# Patient Record
Sex: MALE | Race: BLACK OR AFRICAN AMERICAN | NOT HISPANIC OR LATINO | Employment: FULL TIME | URBAN - METROPOLITAN AREA
[De-identification: names, ages, dates, MRNs, and addresses within clinical notes are randomized per-mention and may not be internally consistent; named-entity substitution may affect disease eponyms.]

---

## 2021-09-05 ENCOUNTER — HOSPITAL ENCOUNTER (INPATIENT)
Facility: HOSPITAL | Age: 26
LOS: 4 days | Discharge: HOME | DRG: 177 | End: 2021-09-10
Attending: EMERGENCY MEDICINE | Admitting: INTERNAL MEDICINE
Payer: COMMERCIAL

## 2021-09-05 ENCOUNTER — APPOINTMENT (EMERGENCY)
Dept: CT IMAGING | Facility: HOSPITAL | Age: 26
DRG: 177 | End: 2021-09-05
Payer: COMMERCIAL

## 2021-09-05 ENCOUNTER — APPOINTMENT (EMERGENCY)
Dept: RADIOLOGY | Facility: HOSPITAL | Age: 26
DRG: 177 | End: 2021-09-05
Payer: COMMERCIAL

## 2021-09-05 DIAGNOSIS — U07.1 PNEUMONIA DUE TO COVID-19 VIRUS: Primary | ICD-10-CM

## 2021-09-05 DIAGNOSIS — I26.99 PULMONARY EMBOLI (HCC): ICD-10-CM

## 2021-09-05 DIAGNOSIS — G47.34 NOCTURNAL HYPOXIA: ICD-10-CM

## 2021-09-05 DIAGNOSIS — I26.99 ACUTE PULMONARY EMBOLISM, UNSPECIFIED PULMONARY EMBOLISM TYPE, UNSPECIFIED WHETHER ACUTE COR PULMONALE PRESENT (HCC): ICD-10-CM

## 2021-09-05 DIAGNOSIS — J12.82 PNEUMONIA DUE TO COVID-19 VIRUS: Primary | ICD-10-CM

## 2021-09-05 DIAGNOSIS — U07.1 COVID-19: ICD-10-CM

## 2021-09-05 PROBLEM — R74.8 ELEVATED LIVER ENZYMES: Status: ACTIVE | Noted: 2021-09-05

## 2021-09-05 PROBLEM — E87.1 HYPONATREMIA: Status: ACTIVE | Noted: 2021-09-05

## 2021-09-05 LAB
ALBUMIN SERPL BCP-MCNC: 3.3 G/DL (ref 3.5–5)
ALP SERPL-CCNC: 68 U/L (ref 46–116)
ALT SERPL W P-5'-P-CCNC: 185 U/L (ref 12–78)
ANION GAP SERPL CALCULATED.3IONS-SCNC: 11 MMOL/L (ref 4–13)
APTT PPP: 29 SECONDS (ref 23–37)
AST SERPL W P-5'-P-CCNC: 69 U/L (ref 5–45)
BASOPHILS # BLD AUTO: 0.03 THOUSANDS/ΜL (ref 0–0.1)
BASOPHILS NFR BLD AUTO: 0 % (ref 0–1)
BILIRUB SERPL-MCNC: 1.65 MG/DL (ref 0.2–1)
BUN SERPL-MCNC: 10 MG/DL (ref 5–25)
CALCIUM ALBUM COR SERPL-MCNC: 9.5 MG/DL (ref 8.3–10.1)
CALCIUM SERPL-MCNC: 8.9 MG/DL (ref 8.3–10.1)
CHLORIDE SERPL-SCNC: 95 MMOL/L (ref 100–108)
CO2 SERPL-SCNC: 26 MMOL/L (ref 21–32)
CREAT SERPL-MCNC: 0.81 MG/DL (ref 0.6–1.3)
D DIMER PPP FEU-MCNC: 3.81 UG/ML FEU
EOSINOPHIL # BLD AUTO: 0.03 THOUSAND/ΜL (ref 0–0.61)
EOSINOPHIL NFR BLD AUTO: 0 % (ref 0–6)
ERYTHROCYTE [DISTWIDTH] IN BLOOD BY AUTOMATED COUNT: 12.4 % (ref 11.6–15.1)
GFR SERPL CREATININE-BSD FRML MDRD: 144 ML/MIN/1.73SQ M
GLUCOSE SERPL-MCNC: 116 MG/DL (ref 65–140)
HCT VFR BLD AUTO: 45.9 % (ref 36.5–49.3)
HGB BLD-MCNC: 16.6 G/DL (ref 12–17)
IMM GRANULOCYTES # BLD AUTO: 0.1 THOUSAND/UL (ref 0–0.2)
IMM GRANULOCYTES NFR BLD AUTO: 1 % (ref 0–2)
INR PPP: 1.12 (ref 0.84–1.19)
LYMPHOCYTES # BLD AUTO: 1.17 THOUSANDS/ΜL (ref 0.6–4.47)
LYMPHOCYTES NFR BLD AUTO: 11 % (ref 14–44)
MCH RBC QN AUTO: 29.6 PG (ref 26.8–34.3)
MCHC RBC AUTO-ENTMCNC: 36.2 G/DL (ref 31.4–37.4)
MCV RBC AUTO: 82 FL (ref 82–98)
MONOCYTES # BLD AUTO: 1.32 THOUSAND/ΜL (ref 0.17–1.22)
MONOCYTES NFR BLD AUTO: 13 % (ref 4–12)
NEUTROPHILS # BLD AUTO: 7.82 THOUSANDS/ΜL (ref 1.85–7.62)
NEUTS SEG NFR BLD AUTO: 75 % (ref 43–75)
NRBC BLD AUTO-RTO: 0 /100 WBCS
PLATELET # BLD AUTO: 363 THOUSANDS/UL (ref 149–390)
PMV BLD AUTO: 9.2 FL (ref 8.9–12.7)
POTASSIUM SERPL-SCNC: 3.6 MMOL/L (ref 3.5–5.3)
PROT SERPL-MCNC: 8.2 G/DL (ref 6.4–8.2)
PROTHROMBIN TIME: 13.9 SECONDS (ref 11.6–14.5)
RBC # BLD AUTO: 5.61 MILLION/UL (ref 3.88–5.62)
SARS-COV-2 RNA RESP QL NAA+PROBE: POSITIVE
SODIUM SERPL-SCNC: 132 MMOL/L (ref 136–145)
TROPONIN I SERPL-MCNC: <0.02 NG/ML
TSH SERPL DL<=0.05 MIU/L-ACNC: 0.26 UIU/ML (ref 0.36–3.74)
WBC # BLD AUTO: 10.47 THOUSAND/UL (ref 4.31–10.16)

## 2021-09-05 PROCEDURE — 85025 COMPLETE CBC W/AUTO DIFF WBC: CPT | Performed by: EMERGENCY MEDICINE

## 2021-09-05 PROCEDURE — 84484 ASSAY OF TROPONIN QUANT: CPT | Performed by: EMERGENCY MEDICINE

## 2021-09-05 PROCEDURE — U0005 INFEC AGEN DETEC AMPLI PROBE: HCPCS | Performed by: EMERGENCY MEDICINE

## 2021-09-05 PROCEDURE — 84443 ASSAY THYROID STIM HORMONE: CPT | Performed by: INTERNAL MEDICINE

## 2021-09-05 PROCEDURE — 71275 CT ANGIOGRAPHY CHEST: CPT

## 2021-09-05 PROCEDURE — 99285 EMERGENCY DEPT VISIT HI MDM: CPT

## 2021-09-05 PROCEDURE — U0003 INFECTIOUS AGENT DETECTION BY NUCLEIC ACID (DNA OR RNA); SEVERE ACUTE RESPIRATORY SYNDROME CORONAVIRUS 2 (SARS-COV-2) (CORONAVIRUS DISEASE [COVID-19]), AMPLIFIED PROBE TECHNIQUE, MAKING USE OF HIGH THROUGHPUT TECHNOLOGIES AS DESCRIBED BY CMS-2020-01-R: HCPCS | Performed by: EMERGENCY MEDICINE

## 2021-09-05 PROCEDURE — 36415 COLL VENOUS BLD VENIPUNCTURE: CPT | Performed by: EMERGENCY MEDICINE

## 2021-09-05 PROCEDURE — 71045 X-RAY EXAM CHEST 1 VIEW: CPT

## 2021-09-05 PROCEDURE — 93005 ELECTROCARDIOGRAM TRACING: CPT

## 2021-09-05 PROCEDURE — 85730 THROMBOPLASTIN TIME PARTIAL: CPT | Performed by: INTERNAL MEDICINE

## 2021-09-05 PROCEDURE — 99220 PR INITIAL OBSERVATION CARE/DAY 70 MINUTES: CPT | Performed by: INTERNAL MEDICINE

## 2021-09-05 PROCEDURE — 85610 PROTHROMBIN TIME: CPT | Performed by: EMERGENCY MEDICINE

## 2021-09-05 PROCEDURE — 96374 THER/PROPH/DIAG INJ IV PUSH: CPT

## 2021-09-05 PROCEDURE — 85379 FIBRIN DEGRADATION QUANT: CPT | Performed by: EMERGENCY MEDICINE

## 2021-09-05 PROCEDURE — 85730 THROMBOPLASTIN TIME PARTIAL: CPT | Performed by: EMERGENCY MEDICINE

## 2021-09-05 PROCEDURE — 80053 COMPREHEN METABOLIC PANEL: CPT | Performed by: EMERGENCY MEDICINE

## 2021-09-05 PROCEDURE — 99291 CRITICAL CARE FIRST HOUR: CPT | Performed by: EMERGENCY MEDICINE

## 2021-09-05 RX ORDER — KETOROLAC TROMETHAMINE 30 MG/ML
30 INJECTION, SOLUTION INTRAMUSCULAR; INTRAVENOUS ONCE
Status: COMPLETED | OUTPATIENT
Start: 2021-09-05 | End: 2021-09-05

## 2021-09-05 RX ORDER — HEPARIN SODIUM 1000 [USP'U]/ML
8800 INJECTION, SOLUTION INTRAVENOUS; SUBCUTANEOUS ONCE
Status: COMPLETED | OUTPATIENT
Start: 2021-09-05 | End: 2021-09-05

## 2021-09-05 RX ORDER — KETOROLAC TROMETHAMINE 30 MG/ML
30 INJECTION, SOLUTION INTRAMUSCULAR; INTRAVENOUS EVERY 6 HOURS PRN
Status: COMPLETED | OUTPATIENT
Start: 2021-09-05 | End: 2021-09-07

## 2021-09-05 RX ORDER — IBUPROFEN 400 MG/1
400 TABLET ORAL EVERY 6 HOURS PRN
Status: DISCONTINUED | OUTPATIENT
Start: 2021-09-05 | End: 2021-09-10 | Stop reason: HOSPADM

## 2021-09-05 RX ORDER — HEPARIN SODIUM 1000 [USP'U]/ML
4400 INJECTION, SOLUTION INTRAVENOUS; SUBCUTANEOUS
Status: DISCONTINUED | OUTPATIENT
Start: 2021-09-05 | End: 2021-09-07

## 2021-09-05 RX ORDER — HEPARIN SODIUM 10000 [USP'U]/100ML
3-30 INJECTION, SOLUTION INTRAVENOUS
Status: DISCONTINUED | OUTPATIENT
Start: 2021-09-05 | End: 2021-09-07

## 2021-09-05 RX ORDER — SODIUM CHLORIDE 9 MG/ML
100 INJECTION, SOLUTION INTRAVENOUS CONTINUOUS
Status: DISPENSED | OUTPATIENT
Start: 2021-09-05 | End: 2021-09-06

## 2021-09-05 RX ORDER — ACETAMINOPHEN 325 MG/1
650 TABLET ORAL EVERY 6 HOURS PRN
Status: DISCONTINUED | OUTPATIENT
Start: 2021-09-05 | End: 2021-09-10 | Stop reason: HOSPADM

## 2021-09-05 RX ORDER — HEPARIN SODIUM 10000 [USP'U]/100ML
3-30 INJECTION, SOLUTION INTRAVENOUS
Status: DISCONTINUED | OUTPATIENT
Start: 2021-09-05 | End: 2021-09-05

## 2021-09-05 RX ORDER — HEPARIN SODIUM 1000 [USP'U]/ML
8800 INJECTION, SOLUTION INTRAVENOUS; SUBCUTANEOUS
Status: DISCONTINUED | OUTPATIENT
Start: 2021-09-05 | End: 2021-09-07

## 2021-09-05 RX ADMIN — HEPARIN SODIUM 18 UNITS/KG/HR: 10000 INJECTION, SOLUTION INTRAVENOUS at 16:56

## 2021-09-05 RX ADMIN — HEPARIN SODIUM 8800 UNITS: 1000 INJECTION INTRAVENOUS; SUBCUTANEOUS at 16:57

## 2021-09-05 RX ADMIN — KETOROLAC TROMETHAMINE 30 MG: 30 INJECTION, SOLUTION INTRAMUSCULAR; INTRAVENOUS at 21:54

## 2021-09-05 RX ADMIN — SODIUM CHLORIDE 100 ML/HR: 0.9 INJECTION, SOLUTION INTRAVENOUS at 16:58

## 2021-09-05 RX ADMIN — KETOROLAC TROMETHAMINE 30 MG: 30 INJECTION, SOLUTION INTRAMUSCULAR; INTRAVENOUS at 13:06

## 2021-09-05 RX ADMIN — IOHEXOL 85 ML: 350 INJECTION, SOLUTION INTRAVENOUS at 14:56

## 2021-09-05 NOTE — ASSESSMENT & PLAN NOTE
· Patient came in with pleuritic chest pain that started last night and did have some nausea secondary to pain  · Etiology likely secondary to COVID; no family history of blood clots  · D-dimer checked and noted to be elevated  · CTA PE study-multiple right lung segmental pulmonary emboli; no RV strain; bilateral pneumonia compatible with COVID-19 infection  · Breathing on room air and on my exam pleuritic chest pain had resolved    Plan  · Currently on heparin drip; will continue at this time, but could be switched to Lovenox tomorrow if needed  · Patient will need oral anticoagulation upon discharge  · Ibuprofen 400 mg Q 6 hour moderate pain  · Toradol 30 mg severe pain  · If patient does have pleuritic chest pain that is worsening could start Medrol Dosepak  · Will send thrombosis panel  · Patient will need follow-up with primary care provider and possibly Hematology referral on discharge

## 2021-09-05 NOTE — H&P
72 [address]  1995, 27 y o  male MRN: [id number]  Unit/Bed#: ED 24 Encounter: [id number]  Primary Care Provider: zO Gong MD   Date and time admitted to hospital: 9/5/2021 12:45 PM    * Pulmonary embolism [facility])  Assessment & Plan  · Patient came in with pleuritic chest pain that started last night and did have some nausea secondary to pain  · Etiology likely secondary to COVID; no family history of blood clots  · D-dimer checked and noted to be elevated  · CTA PE study-multiple right lung segmental pulmonary emboli; no RV strain; bilateral pneumonia compatible with COVID-19 infection  · Breathing on room air and on my exam pleuritic chest pain had resolved    Plan  · Currently on heparin drip; will continue at this time, but could be switched to Lovenox tomorrow if needed  · Patient will need oral anticoagulation upon discharge  · Ibuprofen 400 mg Q 6 hour moderate pain  · Toradol 30 mg severe pain  · If patient does have pleuritic chest pain that is worsening could start Medrol Dosepak  · Will send thrombosis panel  · Patient will need follow-up with primary care provider and possibly Hematology referral on discharge    Hyponatremia  Assessment & Plan  · Sodium 132  · Will give gentle IV hydration  · Repeat tomorrow    Elevated liver enzymes  Assessment & Plan  · No pain to palpation of right upper quadrant on exam  · AST/ALT; 69/185  · Bilirubin 1 69  · Slightly elevated; may be secondary to COVID  · Will check chronic hepatitis panel    COVID-19  Assessment & Plan  · Currently no symptoms of shortness of breath  · CT scan did show findings consistent with COVID pneumonia  · As patient is breathing on room air will hold off on steroids at this time  · Monitor O2 saturation  · Patient is anticoagulated with heparin drip currently    VTE Pharmacologic Prophylaxis: VTE Score: 4 Moderate Risk (Score 3-4) - Pharmacological DVT Prophylaxis Ordered: heparin drip    Code Status: Level 1 - Full Code   Discussion with family: Patient declined call to   Anticipated Length of Stay: Patient will be admitted on an observation basis with an anticipated length of stay of less than 2 midnights secondary to Pulmonary embolism  Total Time for Visit, including Counseling / Coordination of Care: 30 minutes Greater than 50% of this total time spent on direct patient counseling and coordination of care  Chief Complaint:  Right-sided chest pain    History of Present Illness:  Torsten Cohn is a 27 y o  male with a PMH of nothing who presents with right-sided chest pain  Patient states that his symptoms started yesterday at night which he describes as sharp pain when taking deep breath  Patient denies any associated fever, chills, nausea, vomiting, shortness of breath, lightheadedness, dizziness, or palpitations  No family history of blood clotting disorders and patient denies any medication use at this time, and tobacco abuse, alcohol use or drug use  Patient was resting comfortably on examination was breathing on room air throughout exam without any oxygen desaturation noted  Patient did not receive COVID vaccine    Review of Systems:  Review of Systems   Constitutional: Negative for chills, fatigue, fever and unexpected weight change  HENT: Negative for congestion, ear pain, sore throat and tinnitus  Eyes: Negative for visual disturbance  Respiratory: Negative for cough, chest tightness, shortness of breath and wheezing  Cardiovascular: Negative for chest pain, palpitations and leg swelling  Gastrointestinal: Negative for abdominal pain, constipation, diarrhea, nausea and vomiting  Genitourinary: Negative for dysuria and frequency  Skin: Negative for rash  Neurological: Negative for dizziness, weakness, light-headedness, numbness and headaches  All other systems reviewed and are negative        Past Medical and Surgical History:   History reviewed  No pertinent past medical history  History reviewed  No pertinent surgical history  Meds/Allergies:  Prior to Admission medications    Not on File     I have reviewed home medications with patient personally  Allergies: No Known Allergies    Social History:  Marital Status: Single   Patient Pre-hospital Living Situation: Home  Patient Pre-hospital Level of Mobility: walks  Substance Use History:   Social History     Substance and Sexual Activity   Alcohol Use Not Currently     Social History     Tobacco Use   Smoking Status Never Smoker   Smokeless Tobacco Never Used     Social History     Substance and Sexual Activity   Drug Use Not Currently       Family History:  History reviewed  No pertinent family history  Physical Exam:     Vitals:   Blood Pressure: 125/70 (09/05/21 1530)  Pulse: 85 (09/05/21 1530)  Respirations: (!) 24 (09/05/21 1530)  Height: 6' 1" (185 4 cm) (09/05/21 1237)  Weight - Scale: 104 kg (229 lb 4 5 oz) (09/05/21 1602)  SpO2: 94 % (09/05/21 1530)    Physical Exam  Vitals and nursing note reviewed  Constitutional:       General: He is not in acute distress  Appearance: He is well-developed  Comments: WESLEY PEACOCKT:      Head: Normocephalic and atraumatic  Eyes:      General: No scleral icterus  Conjunctiva/sclera: Conjunctivae normal    Cardiovascular:      Rate and Rhythm: Normal rate and regular rhythm  Heart sounds: Normal heart sounds  No murmur heard  No friction rub  No gallop  Pulmonary:      Effort: Pulmonary effort is normal  No respiratory distress  Breath sounds: Normal breath sounds  No wheezing or rales  Abdominal:      General: Bowel sounds are normal  There is no distension  Palpations: Abdomen is soft  Tenderness: There is no abdominal tenderness  Musculoskeletal:         General: Normal range of motion  Skin:     General: Skin is warm  Findings: No rash     Neurological:      Mental Status: He is alert and oriented to person, place, and time  Additional Data:     Lab Results:  Results from last 7 days   Lab Units 09/05/21  1310   WBC Thousand/uL 10 47*   HEMOGLOBIN g/dL 16 6   HEMATOCRIT % 45 9   PLATELETS Thousands/uL 363   NEUTROS PCT % 75   LYMPHS PCT % 11*   MONOS PCT % 13*   EOS PCT % 0     Results from last 7 days   Lab Units 09/05/21  1310   SODIUM mmol/L 132*   POTASSIUM mmol/L 3 6   CHLORIDE mmol/L 95*   CO2 mmol/L 26   BUN mg/dL 10   CREATININE mg/dL 0 81   ANION GAP mmol/L 11   CALCIUM mg/dL 8 9   ALBUMIN g/dL 3 3*   TOTAL BILIRUBIN mg/dL 1 65*   ALK PHOS U/L 68   ALT U/L 185*   AST U/L 69*   GLUCOSE RANDOM mg/dL 116     Results from last 7 days   Lab Units 09/05/21  1310   INR  1 12                   Imaging: Reviewed radiology reports from this admission including: chest CT scan  CTA ED chest PE Study   Final Result by Ralph Avendano MD (09/05 1530)      Multiple right lung segmental pulmonary emboli  Measured RV/LV ratio is within normal limits at less than 0 9  Extensive bilateral pneumonia compatible with confirmed COVID 19 infection  I personally discussed this study with Gil Abraham on 9/5/2021 at 3:30 PM                Workstation performed: [id number]         XR chest 1 view portable   Final Result by Ralph Avendano MD (09/05 1522)      Bilateral pneumonia compatible with confirmed COVID 19 infection  Workstation performed: [id number]             EKG and Other Studies Reviewed on Admission:   · EKG: NSR  HR 89     ** Please Note: This note has been constructed using a voice recognition system   **

## 2021-09-05 NOTE — ASSESSMENT & PLAN NOTE
· Present on admission; likely in setting of covid-19 infection  · No pain to palpation of right upper quadrant on exam  · AST/ALT; 69/185; Bilirubin 1 69 on admission  · Chronic hepatitis panel ordered; not yet collected  · Trending down today

## 2021-09-05 NOTE — ASSESSMENT & PLAN NOTE
· Patient came in with pleuritic chest pain that started last night with nausea, secondary to pain  · Etiology likely secondary to COVID; no family history of blood clots  · D-dimer checked and noted to be elevated  · CTA PE study-multiple right lung segmental pulmonary emboli; no RV strain; bilateral pneumonia compatible with COVID-19 infection  · Breathing on room air, with oxygen saturation levels of 93-97%; still with some pleuritic chest pain, although improved  · Currently on heparin drip; will continue at this time, but could be switched to Lovenox tomorrow if needed  · Patient will need oral anticoagulation upon discharge, will [facility]  · Ibuprofen 400 mg Q 6 hour moderate pain  · Toradol 30 mg severe pain  · If patient does have pleuritic chest pain that is worsening could start Medrol Dosepak  · Thrombosis panel pending    · Patient will need follow-up with primary care provider and possibly Hematology referral on discharge

## 2021-09-05 NOTE — ASSESSMENT & PLAN NOTE
· Currently no symptoms of shortness of breath  · CT scan did show findings consistent with COVID pneumonia  · As patient is breathing on room air will hold off on steroids at this time  · Monitor O2 saturation  · Patient is anticoagulated with heparin drip currently

## 2021-09-05 NOTE — ED PROVIDER NOTES
History  Chief Complaint   Patient presents with    Abdominal Pain     pt reports RUQ abdominal pain x2 days     27year old male patient presents emergency department for evaluation of right-sided chest pain  The pain is in the costophrenic angle and only improves when he is sitting forward  The patient sat was 93% when I walked into the room and seems to be because the patient's splinting, not taking deep breaths, and a great deal of discomfort  He denies my history, meds, allergies, tobacco abuse, or any other associated symptoms  He denies family history of early coronary artery disease, he denies family history of any blood clotting disorder  Patient is currently sitting at the edge of the bed, speaking in full sentences with pausing to breathe  He states that he is doing this because of pain  Bedside ultrasound lung slide the gallbladder is grossly normal in its appearance, as is kidney  Differential diagnosis for this patient includes was not limited to pulmonary embolism, acute coronary syndrome, pneumothorax      History provided by:  Patient   used: No    Abdominal Pain  Pain location:  Generalized  Pain quality: aching, heavy and sharp    Pain radiates to:  Does not radiate  Pain severity:  Mild  Onset quality:  Gradual  Timing:  Constant  Progression:  Worsening  Chronicity:  New  Context: not awakening from sleep, not diet changes and not recent sexual activity    Relieved by:  Nothing  Worsened by:  Nothing  Ineffective treatments:  None tried  Associated symptoms: no cough, no fever, no hematochezia and no sore throat        None       History reviewed  No pertinent past medical history  History reviewed  No pertinent surgical history  History reviewed  No pertinent family history  I have reviewed and agree with the history as documented      E-Cigarette/Vaping    E-Cigarette Use Never User      E-Cigarette/Vaping Substances     Social History     Tobacco Use    Smoking status: Never Smoker    Smokeless tobacco: Never Used   Vaping Use    Vaping Use: Never used   Substance Use Topics    Alcohol use: Not Currently    Drug use: Not Currently       Review of Systems   Constitutional: Negative for fever  HENT: Negative for sore throat  Respiratory: Negative for cough  Gastrointestinal: Positive for abdominal pain  Negative for hematochezia  All other systems reviewed and are negative  Physical Exam  Physical Exam  Vitals and nursing note reviewed  Constitutional:       General: He is not in acute distress  Appearance: He is well-developed  He is not diaphoretic  HENT:      Head: Normocephalic and atraumatic  Right Ear: External ear normal       Left Ear: External ear normal    Eyes:      General: No scleral icterus  Right eye: No discharge  Left eye: No discharge  Conjunctiva/sclera: Conjunctivae normal    Neck:      Thyroid: No thyromegaly  Vascular: No JVD  Trachea: No tracheal deviation  Cardiovascular:      Rate and Rhythm: Normal rate and regular rhythm  Pulmonary:      Effort: Pulmonary effort is normal  No respiratory distress  Breath sounds: Normal breath sounds  No stridor  No wheezing or rales  Abdominal:      General: Bowel sounds are normal  There is no distension  Palpations: Abdomen is soft  Tenderness: There is no abdominal tenderness  Musculoskeletal:         General: No tenderness or deformity  Normal range of motion  Cervical back: Normal range of motion and neck supple  Skin:     General: Skin is warm and dry  Neurological:      Mental Status: He is alert and oriented to person, place, and time  Cranial Nerves: No cranial nerve deficit        Coordination: Coordination normal    Psychiatric:         Behavior: Behavior normal          Vital Signs  ED Triage Vitals [09/05/21 1237]   Temp Pulse Respirations Blood Pressure SpO2   -- 102 21 145/82 95 %      Temp src Heart Rate Source Patient Position - Orthostatic VS BP Location FiO2 (%)   -- Monitor Sitting Right arm --      Pain Score       9           Vitals:    09/05/21 1237   BP: 145/82   Pulse: 102   Patient Position - Orthostatic VS: Sitting         Visual Acuity      ED Medications  Medications   ketorolac (TORADOL) injection 30 mg (has no administration in time range)       Diagnostic Studies  Results Reviewed     Procedure Component Value Units Date/Time    D-dimer, quantitative [[id number]]     Lab Status: No result Specimen: Blood     Novel Coronavirus (Covid-19),PCR [facility] [[id number]]     Lab Status: No result Specimen: Nares from Nose     Troponin I [[id number]]     Lab Status: No result Specimen: Blood     CBC and differential [[id number]]     Lab Status: No result Specimen: Blood     Comprehensive metabolic panel [[id number]]     Lab Status: No result Specimen: Blood                  XR chest 1 view portable    (Results Pending)              Procedures  CriticalCare Time  Performed by: Alfredo Sanchez DO  Authorized by: Alfredo Sanchez DO     Critical care provider statement:     Critical care time (minutes):  40    Critical care time was exclusive of:  Separately billable procedures and treating other patients    Critical care was time spent personally by me on the following activities:  Interpretation of cardiac output measurements, ordering and performing treatments and interventions, ordering and review of laboratory studies, ordering and review of radiographic studies, re-evaluation of patient's condition, review of old charts, examination of patient and evaluation of patient's response to treatment    I assumed direction of critical care for this patient from another provider in my specialty: no    Comments:      Patient was acute pulmonary emboli requiring supplemental oxygen and IV blood thinners               ED Course  ED Course as of Sep 05 1726   Stephane Londono Sep 05, 2021   1250 Bedside ultrasound shows good lung slide in the apex of the right lung  Gallbladder is distended but within normal limits, kidneys normal in its appearance  Patient's oxygen saturation of 94% seems to be from him not able to take deep breaths  This is new to him, is never happened before, and he is uncomfortable enough that he cannot lay flat without a great deal of pain  He has no history, no meds no allergies  1423 Pt aware of elevated dimer and covid positivity                                              MDM  Number of Diagnoses or Management Options  Pneumonia due to COVID-19 virus: new and requires workup  Pulmonary emboli [facility]): new and requires workup     Amount and/or Complexity of Data Reviewed  Clinical lab tests: ordered and reviewed  Tests in the radiology section of CPT®: ordered and reviewed  Decide to obtain previous medical records or to obtain history from someone other than the patient: yes  Review and summarize past medical records: yes    Patient Progress  Patient progress: stable      Disposition  Final diagnoses:   None     ED Disposition     None      Follow-up Information    None         Patient's Medications    No medications on file     No discharge procedures on file      PDMP Review     None          ED Provider  Electronically Signed by           Chanel Means DO  09/05/21 6849

## 2021-09-05 NOTE — ASSESSMENT & PLAN NOTE
· Patient originally diagnosed on 8/26  · Currently, no symptoms of shortness of breath  · CT scan did show findings consistent with COVID pneumonia  · As patient is breathing on room air will hold off on steroids at this time  · Monitor O2 saturation, 93-97% on room air  · Patient is anticoagulated with heparin drip currently due to PE    · Will price check Eliquis

## 2021-09-06 LAB
ALBUMIN SERPL BCP-MCNC: 3 G/DL (ref 3.5–5)
ALP SERPL-CCNC: 64 U/L (ref 46–116)
ALT SERPL W P-5'-P-CCNC: 136 U/L (ref 12–78)
ANION GAP SERPL CALCULATED.3IONS-SCNC: 13 MMOL/L (ref 4–13)
APTT PPP: 138 SECONDS (ref 23–37)
APTT PPP: 62 SECONDS (ref 23–37)
APTT PPP: 78 SECONDS (ref 23–37)
AST SERPL W P-5'-P-CCNC: 42 U/L (ref 5–45)
ATRIAL RATE: 89 BPM
BILIRUB SERPL-MCNC: 1.29 MG/DL (ref 0.2–1)
BUN SERPL-MCNC: 10 MG/DL (ref 5–25)
CALCIUM ALBUM COR SERPL-MCNC: 9.7 MG/DL (ref 8.3–10.1)
CALCIUM SERPL-MCNC: 8.9 MG/DL (ref 8.3–10.1)
CHLORIDE SERPL-SCNC: 97 MMOL/L (ref 100–108)
CO2 SERPL-SCNC: 23 MMOL/L (ref 21–32)
CREAT SERPL-MCNC: 0.77 MG/DL (ref 0.6–1.3)
DEPRECATED AT III PPP: 105 % OF NORMAL (ref 92–136)
ERYTHROCYTE [DISTWIDTH] IN BLOOD BY AUTOMATED COUNT: 12.4 % (ref 11.6–15.1)
GFR SERPL CREATININE-BSD FRML MDRD: 147 ML/MIN/1.73SQ M
GLUCOSE P FAST SERPL-MCNC: 96 MG/DL (ref 65–99)
GLUCOSE SERPL-MCNC: 96 MG/DL (ref 65–140)
HBV CORE AB SER QL: NORMAL
HBV CORE IGM SER QL: NORMAL
HBV SURFACE AG SER QL: NORMAL
HCT VFR BLD AUTO: 42.7 % (ref 36.5–49.3)
HCV AB SER QL: NORMAL
HGB BLD-MCNC: 15.2 G/DL (ref 12–17)
MCH RBC QN AUTO: 29.5 PG (ref 26.8–34.3)
MCHC RBC AUTO-ENTMCNC: 35.6 G/DL (ref 31.4–37.4)
MCV RBC AUTO: 83 FL (ref 82–98)
P AXIS: 62 DEGREES
PLATELET # BLD AUTO: 373 THOUSANDS/UL (ref 149–390)
PMV BLD AUTO: 9.3 FL (ref 8.9–12.7)
POTASSIUM SERPL-SCNC: 3.5 MMOL/L (ref 3.5–5.3)
PR INTERVAL: 158 MS
PROT C AG ACT/NOR PPP IA: 87 % OF NORMAL (ref 60–150)
PROT SERPL-MCNC: 7.7 G/DL (ref 6.4–8.2)
QRS AXIS: 72 DEGREES
QRSD INTERVAL: 108 MS
QT INTERVAL: 380 MS
QTC INTERVAL: 462 MS
RBC # BLD AUTO: 5.15 MILLION/UL (ref 3.88–5.62)
SODIUM SERPL-SCNC: 133 MMOL/L (ref 136–145)
T WAVE AXIS: 58 DEGREES
VENTRICULAR RATE: 89 BPM
WBC # BLD AUTO: 12.79 THOUSAND/UL (ref 4.31–10.16)

## 2021-09-06 PROCEDURE — 81241 F5 GENE: CPT | Performed by: INTERNAL MEDICINE

## 2021-09-06 PROCEDURE — 86147 CARDIOLIPIN ANTIBODY EA IG: CPT | Performed by: INTERNAL MEDICINE

## 2021-09-06 PROCEDURE — 85730 THROMBOPLASTIN TIME PARTIAL: CPT | Performed by: INTERNAL MEDICINE

## 2021-09-06 PROCEDURE — 85613 RUSSELL VIPER VENOM DILUTED: CPT | Performed by: INTERNAL MEDICINE

## 2021-09-06 PROCEDURE — 93010 ELECTROCARDIOGRAM REPORT: CPT | Performed by: INTERNAL MEDICINE

## 2021-09-06 PROCEDURE — 85705 THROMBOPLASTIN INHIBITION: CPT | Performed by: INTERNAL MEDICINE

## 2021-09-06 PROCEDURE — 85303 CLOT INHIBIT PROT C ACTIVITY: CPT | Performed by: INTERNAL MEDICINE

## 2021-09-06 PROCEDURE — 81240 F2 GENE: CPT | Performed by: INTERNAL MEDICINE

## 2021-09-06 PROCEDURE — 86705 HEP B CORE ANTIBODY IGM: CPT | Performed by: INTERNAL MEDICINE

## 2021-09-06 PROCEDURE — 85598 HEXAGNAL PHOSPH PLTLT NEUTRL: CPT | Performed by: INTERNAL MEDICINE

## 2021-09-06 PROCEDURE — 99232 SBSQ HOSP IP/OBS MODERATE 35: CPT | Performed by: NURSE PRACTITIONER

## 2021-09-06 PROCEDURE — 85300 ANTITHROMBIN III ACTIVITY: CPT | Performed by: INTERNAL MEDICINE

## 2021-09-06 PROCEDURE — 86146 BETA-2 GLYCOPROTEIN ANTIBODY: CPT | Performed by: INTERNAL MEDICINE

## 2021-09-06 PROCEDURE — 86803 HEPATITIS C AB TEST: CPT | Performed by: INTERNAL MEDICINE

## 2021-09-06 PROCEDURE — 85732 THROMBOPLASTIN TIME PARTIAL: CPT | Performed by: INTERNAL MEDICINE

## 2021-09-06 PROCEDURE — 80053 COMPREHEN METABOLIC PANEL: CPT | Performed by: INTERNAL MEDICINE

## 2021-09-06 PROCEDURE — 85670 THROMBIN TIME PLASMA: CPT | Performed by: INTERNAL MEDICINE

## 2021-09-06 PROCEDURE — 86704 HEP B CORE ANTIBODY TOTAL: CPT | Performed by: INTERNAL MEDICINE

## 2021-09-06 PROCEDURE — 85306 CLOT INHIBIT PROT S FREE: CPT | Performed by: INTERNAL MEDICINE

## 2021-09-06 PROCEDURE — 85305 CLOT INHIBIT PROT S TOTAL: CPT | Performed by: INTERNAL MEDICINE

## 2021-09-06 PROCEDURE — 87340 HEPATITIS B SURFACE AG IA: CPT | Performed by: INTERNAL MEDICINE

## 2021-09-06 PROCEDURE — 85027 COMPLETE CBC AUTOMATED: CPT | Performed by: INTERNAL MEDICINE

## 2021-09-06 RX ORDER — TRAMADOL HYDROCHLORIDE 50 MG/1
50 TABLET ORAL EVERY 6 HOURS PRN
Status: DISCONTINUED | OUTPATIENT
Start: 2021-09-06 | End: 2021-09-10 | Stop reason: HOSPADM

## 2021-09-06 RX ORDER — TRAMADOL HYDROCHLORIDE 50 MG/1
50 TABLET ORAL EVERY 6 HOURS PRN
Status: DISCONTINUED | OUTPATIENT
Start: 2021-09-06 | End: 2021-09-06

## 2021-09-06 RX ADMIN — IBUPROFEN 400 MG: 400 TABLET ORAL at 15:53

## 2021-09-06 RX ADMIN — KETOROLAC TROMETHAMINE 30 MG: 30 INJECTION, SOLUTION INTRAMUSCULAR; INTRAVENOUS at 15:53

## 2021-09-06 RX ADMIN — ACETAMINOPHEN 650 MG: 325 TABLET, FILM COATED ORAL at 20:49

## 2021-09-06 RX ADMIN — HEPARIN SODIUM 15 UNITS/KG/HR: 10000 INJECTION, SOLUTION INTRAVENOUS at 23:17

## 2021-09-06 RX ADMIN — TRAMADOL HYDROCHLORIDE 50 MG: 50 TABLET, FILM COATED ORAL at 21:56

## 2021-09-06 RX ADMIN — HEPARIN SODIUM 15 UNITS/KG/HR: 10000 INJECTION, SOLUTION INTRAVENOUS at 08:28

## 2021-09-06 RX ADMIN — IBUPROFEN 400 MG: 400 TABLET ORAL at 08:28

## 2021-09-06 RX ADMIN — KETOROLAC TROMETHAMINE 30 MG: 30 INJECTION, SOLUTION INTRAMUSCULAR; INTRAVENOUS at 08:28

## 2021-09-06 NOTE — PROGRESS NOTES
[facility]  Progress Note - Danyell Yoo 1995, 27 y o  male MRN: [id number]  Unit/Bed#: -01 Encounter: [id number]  Primary Care Provider: Tucker Elena MD   Date and time admitted to hospital: 9/5/2021 12:45 PM    * Pulmonary embolism [facility])  Assessment & Plan  · Patient came in with pleuritic chest pain that started last night with nausea, secondary to pain  · Etiology likely secondary to COVID; no family history of blood clots  · D-dimer checked and noted to be elevated  · CTA PE study-multiple right lung segmental pulmonary emboli; no RV strain; bilateral pneumonia compatible with COVID-19 infection  · Breathing on room air, with oxygen saturation levels of 93-97%; still with some pleuritic chest pain, although improved  · Currently on heparin drip; will continue at this time, but could be switched to Lovenox tomorrow if needed  · Patient will need oral anticoagulation upon discharge, will price check through [facility]  · Ibuprofen 400 mg Q 6 hour moderate pain  · Toradol 30 mg severe pain  · If patient does have pleuritic chest pain that is worsening could start Medrol Dosepak  · Thrombosis panel pending  · Patient will need follow-up with primary care provider and possibly Hematology referral on discharge    COVID-19  Assessment & Plan  · Patient originally diagnosed on 8/26  · Currently, no symptoms of shortness of breath  · CT scan did show findings consistent with COVID pneumonia  · As patient is breathing on room air will hold off on steroids at this time  · Monitor O2 saturation, 93-97% on room air  · Patient is anticoagulated with heparin drip currently due to PE  · Will price check Eliquis    Hyponatremia  Assessment & Plan  · Present on admission; likely in setting of covid-19 infection  · Sodium 133 today, up from 132  Elevated liver enzymes  Assessment & Plan  · Present on admission; likely in setting of covid-19 infection    · No pain to palpation of right upper quadrant on exam  · AST/ALT; 69/185; Bilirubin 1 69 on admission  · Chronic hepatitis panel ordered; not yet collected  · Trending down today  VTE Pharmacologic Prophylaxis: VTE Score: 4 Moderate Risk (Score 3-4) - Pharmacological DVT Prophylaxis Contraindicated  Sequential Compression Devices Ordered  Patient Centered Rounds: I performed bedside rounds with nursing staff today  Discussions with Specialists or Other Care Team Provider:     Education and Discussions with Family / Patient: Attempted to update  (mother) via phone  Left voicemail  Time Spent for Care: 20 minutes  More than 50% of total time spent on counseling and coordination of care as described above  Current Length of Stay: 0 day(s)  Current Patient Status: Observation   Certification Statement: The patient, admitted on an observation basis, will now require > 2 midnight hospital stay due to ongoing pleuritic chest pain, need for outpatient eliquis workup, still on heparin drip  Discharge Plan: Anticipate discharge later today or tomorrow to home  Code Status: Level 1 - Full Code    Subjective:   CC: "i'm still having some pain, but I just had ibuprofen "    Patient resting in bed, watching television  Reports he was first diagnosed with covid-19 on   States he has been active since being diagnosed by going outside on his fire escape and trying to be up and moving  Denies complaints of worsening shortness of breath, fever, or chills overnight  Reports ibuprofen/toradol helps with his pain  Objective:     Vitals:   Temp (24hrs), Av 5 °F (36 9 °C), Min:97 7 °F (36 5 °C), Max:99 1 °F (37 3 °C)    Temp:  [97 7 °F (36 5 °C)-99 1 °F (37 3 °C)] 99 1 °F (37 3 °C)  HR:  [] 90  Resp:  [18-24] 18  BP: (113-145)/(70-85) 133/85  SpO2:  [93 %-96 %] 93 %  Body mass index is 30 25 kg/m²  Input and Output Summary (last 24 hours):      Intake/Output Summary (Last 24 hours) at 2021 0953  Last data filed at 9/6/2021 0500  Gross per 24 hour   Intake 480 ml   Output 600 ml   Net -120 ml       Physical Exam:   Physical Exam  Vitals and nursing note reviewed  Constitutional:       General: He is not in acute distress  Cardiovascular:      Rate and Rhythm: Normal rate  Pulses: Normal pulses  Heart sounds: Normal heart sounds  Pulmonary:      Effort: Pulmonary effort is normal       Breath sounds: Normal breath sounds  Abdominal:      General: Bowel sounds are normal       Palpations: Abdomen is soft  Musculoskeletal:         General: Normal range of motion  Skin:     General: Skin is warm and dry  Capillary Refill: Capillary refill takes less than 2 seconds  Neurological:      Mental Status: He is alert and oriented to person, place, and time  Psychiatric:         Mood and Affect: Mood normal           Additional Data:     Labs:  Results from last 7 days   Lab Units 09/06/21  0512 09/05/21  1310   WBC Thousand/uL 12 79* 10 47*   HEMOGLOBIN g/dL 15 2 16 6   HEMATOCRIT % 42 7 45 9   PLATELETS Thousands/uL 373 363   NEUTROS PCT %  --  75   LYMPHS PCT %  --  11*   MONOS PCT %  --  13*   EOS PCT %  --  0     Results from last 7 days   Lab Units 09/06/21  0512   SODIUM mmol/L 133*   POTASSIUM mmol/L 3 5   CHLORIDE mmol/L 97*   CO2 mmol/L 23   BUN mg/dL 10   CREATININE mg/dL 0 77   ANION GAP mmol/L 13   CALCIUM mg/dL 8 9   ALBUMIN g/dL 3 0*   TOTAL BILIRUBIN mg/dL 1 29*   ALK PHOS U/L 64   ALT U/L 136*   AST U/L 42   GLUCOSE RANDOM mg/dL 96     Results from last 7 days   Lab Units 09/05/21  1310   INR  1 12                   Lines/Drains:  Invasive Devices     Peripheral Intravenous Line            Peripheral IV 09/05/21 Left Antecubital <1 day                Imaging: No pertinent imaging reviewed      Recent Cultures (last 7 days):         Last 24 Hours Medication List:   Current Facility-Administered Medications   Medication Dose Route Frequency Provider Last Rate    acetaminophen  650 mg Oral Q6H PRN Rowan Glad, DO      heparin (porcine)  3-30 Units/kg/hr (Order-Specific) Intravenous Titrated Rowan Glad, DO 15 Units/kg/hr (09/06/21 0828)    heparin (porcine)  4,400 Units Intravenous Q1H PRN Rowan Glad, DO      heparin (porcine)  8,800 Units Intravenous Q1H PRN Rowan Glad, DO      ibuprofen  400 mg Oral Q6H PRN Rowan Glad, DO      ketorolac  30 mg Intravenous Q6H PRN Rowan Glad, DO          Today, Patient Was Seen By: DALE Huynh    **Please Note: This note may have been constructed using a voice recognition system  **

## 2021-09-06 NOTE — UTILIZATION REVIEW
Initial Clinical Review    Admission: Date/Time/Statement:   Admission Orders (From admission, onward)     Ordered        09/05/21 1605  Place in Observation  Once                   Orders Placed This Encounter   Procedures    Place in Observation     Standing Status:   Standing     Number of Occurrences:   1     Order Specific Question:   Level of Care     Answer:   Med Surg [16]     ED Arrival Information     Expected Arrival Acuity    - 9/5/2021 12:32 Urgent         Means of arrival Escorted by Service Admission type    Walk-In Self Hospitalist Urgent         Arrival complaint    Abdominal Pain        Chief Complaint   Patient presents with    Abdominal Pain     pt reports RUQ abdominal pain x2 days     Initial Presentation:   27y Male to ED presents with right-sided chest pain  Started with sharp pain on taking deep breath yesterday  He did not receive COVID vaccine  Admit Observation level of care for Pulmonary embolism, Hyponatremia, Elevated liver enzymes and COVID-19  CTA PE study-multiple right lung segmental pulmonary emboli; no RV strain; bilateral pneumonia compatible with COVID-19 infection  Started on Heparin drip and continue  Ibuprofen and Toradol prn for pain  Thrombosis panel ordered  Na 132  IVFs started  Repeat tomorrow  AST/ALT: 69/ 185, Bilirubin 1 69  Check chronic hepatitis panel       Date:  Day 2:     ED Triage Vitals   Temperature Pulse Respirations Blood Pressure SpO2   09/05/21 1700 09/05/21 1237 09/05/21 1237 09/05/21 1237 09/05/21 1237   97 7 °F (36 5 °C) 102 21 145/82 95 %      Temp Source Heart Rate Source Patient Position - Orthostatic VS BP Location FiO2 (%)   09/05/21 1700 09/05/21 1237 09/05/21 1237 09/05/21 1237 --   Temporal Monitor Sitting Right arm       Pain Score       09/05/21 1237       9          Wt Readings from Last 1 Encounters:   09/05/21 104 kg (229 lb 4 5 oz)     Additional Vital Signs:   09/06/21 08:16:45  99 1 °F (37 3 °C)  --  18  133/85  101  --  --  -- 09/06/21 00:00:43  99 °F (37 2 °C)  --  20  127/75  92  --  --  --   09/05/21 2124  98 2 °F (36 8 °C)  --  20  130/76  94  93 %  None (Room air)  Lying   09/05/21 1830  --  90  19  113/72  83  96 %  None (Room air)  Lying   09/05/21 1700  97 7 °F (36 5 °C)  94  21  118/70  89  95 %  None (Room air)       Pertinent Labs/Diagnostic Test Results:   9/5  PCXR - Bilateral pneumonia compatible with confirmed COVID 19 infection  CTA ed chest pe study - Multiple right lung segmental pulmonary emboli  Measured RV/LV ratio is within normal limits at less than 0 9      Extensive bilateral pneumonia compatible with confirmed COVID 19 infection       Results from last 7 days   Lab Units 09/05/21  1310   SARS-COV-2  Positive*     Results from last 7 days   Lab Units 09/06/21  0512 09/05/21  1310   WBC Thousand/uL 12 79* 10 47*   HEMOGLOBIN g/dL 15 2 16 6   HEMATOCRIT % 42 7 45 9   PLATELETS Thousands/uL 373 363   NEUTROS ABS Thousands/µL  --  7 82*         Results from last 7 days   Lab Units 09/06/21  0512 09/05/21  1310   SODIUM mmol/L 133* 132*   POTASSIUM mmol/L 3 5 3 6   CHLORIDE mmol/L 97* 95*   CO2 mmol/L 23 26   ANION GAP mmol/L 13 11   BUN mg/dL 10 10   CREATININE mg/dL 0 77 0 81   EGFR ml/min/1 73sq m 147 144   CALCIUM mg/dL 8 9 8 9     Results from last 7 days   Lab Units 09/06/21  0512 09/05/21  1310   AST U/L 42 69*   ALT U/L 136* 185*   ALK PHOS U/L 64 68   TOTAL PROTEIN g/dL 7 7 8 2   ALBUMIN g/dL 3 0* 3 3*   TOTAL BILIRUBIN mg/dL 1 29* 1 65*         Results from last 7 days   Lab Units 09/06/21  0512 09/05/21  1310   GLUCOSE RANDOM mg/dL 96 116       Results from last 7 days   Lab Units 09/05/21  1310   TROPONIN I ng/mL <0 02     Results from last 7 days   Lab Units 09/05/21  1310   D-DIMER QUANTITATIVE ug/ml FEU 3 81*     Results from last 7 days   Lab Units 09/05/21  2332 09/05/21  1310   PROTIME seconds  --  13 9   INR   --  1 12   PTT seconds 138* 29     Results from last 7 days   Lab Units 09/05/21  1310 TSH 3RD GENERATON uIU/mL 0 264*       ED Treatment:   Medication Administration from 09/05/2021 1232 to 09/05/2021 2054       Date/Time Order Dose Route Action     09/05/2021 1306 ketorolac (TORADOL) injection 30 mg 30 mg Intravenous Given     09/05/2021 1456 iohexol (OMNIPAQUE) 350 MG/ML injection (SINGLE-DOSE) 85 mL 85 mL Intravenous Given     09/05/2021 1657 heparin (porcine) injection 8,800 Units 8,800 Units Intravenous Given     09/05/2021 1658 sodium chloride 0 9 % infusion 100 mL/hr Intravenous New Bag     09/05/2021 1656 heparin (porcine) 25,000 units in 0 45% NaCl 250 mL infusion (premix) 18 Units/kg/hr Intravenous New Bag        History reviewed  No pertinent past medical history  Present on Admission:   COVID-19   Pulmonary embolism (HCC)   Elevated liver enzymes   Hyponatremia      Admitting Diagnosis: Abdominal pain [R10 9]  Pulmonary emboli (HCC) [I26 99]  Pneumonia due to COVID-19 virus [U07 1, J12 82]  Age/Sex: 27 y o  male     Admission Orders:  Scheduled Medications:     Continuous IV Infusions:  heparin (porcine), 3-30 Units/kg/hr (Order-Specific), Intravenous, Titrated      PRN Meds:  acetaminophen, 650 mg, Oral, Q6H PRN  ibuprofen, 400 mg, Oral, Q6H PRN 9/6 x1  ketorolac, 30 mg, Intravenous, Q6H PRN 9/5 x1, 9/6 x1        IP CONSULT TO CASE MANAGEMENT    Network Utilization Review Department  ATTENTION: Please call with any questions or concerns to [phone] and carefully listen to the prompts so that you are directed to the right person  All voicemails are confidential   Grafton Quirk all requests for admission clinical reviews, approved or denied determinations and any other requests to dedicated fax number below belonging to the campus where the patient is receiving treatment   List of dedicated fax numbers for the Facilities:  [address] [place] [address] DENIALS (Administrative/Medical Necessity) [phone]   [address] [place] [address] (Maternity/NICU/Pediatrics) [phone]     [address]  [facility] [address] [place] [address] 9091 [address] [place] [address] [address] [place] [address] [phone]

## 2021-09-06 NOTE — UTILIZATION REVIEW
Continued Stay Review    Observation 9/5 @ 1605 and changed to Inpatient on 9/6 @ 1143  Pt requiring continued stay for PE/ COVID-18 and Elevated LFTs on Iv Heparin drip  Date: 9/6                       Start   Ordered   09/06/21 1144  Inpatient Admission Once     Transfer Service: Hospitalist       Question Answer Comment   Level of Care Med Surg    Estimated length of stay More than 2 Midnights    Certification I certify that inpatient services are medically necessary for this patient for a duration of greater than two midnights  See H&P and MD Progress Notes for additional information about the patient's course of treatment  09/06/21 1143   09/05/21 1605  Place in Observation Once     Question: Level of Care Answer: Med Surg    09/05/21 1605       Current Patient Class: Inpatient  Current Level of Care: Med Surg    HPI:26 y o  male initially admitted on 9/6    Assessment/Plan:   Continue in Iv Heparin drip  Pain control  Thrombosis panel pending  Originally diagnosed on 8/26, no shortness of breath at this time  Sodium 133 today, up from 132  Chronic hepatitis panel ordered/  LFTs trending down today      Vital Signs:   09/06/21 08:16:45  99 1 °F (37 3 °C)  --  18  133/85  101  --  Room air  --   09/06/21 00:00:43  99 °F (37 2 °C)  --  20  127/75  92  --  --  --     Pertinent Labs/Diagnostic Results:   Results from last 7 days   Lab Units 09/05/21  1310   SARS-COV-2  Positive*     Results from last 7 days   Lab Units 09/06/21  0512 09/05/21  1310   WBC Thousand/uL 12 79* 10 47*   HEMOGLOBIN g/dL 15 2 16 6   HEMATOCRIT % 42 7 45 9   PLATELETS Thousands/uL 373 363   NEUTROS ABS Thousands/µL  --  7 82*         Results from last 7 days   Lab Units 09/06/21  0512 09/05/21  1310   SODIUM mmol/L 133* 132*   POTASSIUM mmol/L 3 5 3 6   CHLORIDE mmol/L 97* 95*   CO2 mmol/L 23 26   ANION GAP mmol/L 13 11   BUN mg/dL 10 10   CREATININE mg/dL 0 77 0 81   EGFR ml/min/1 73sq m 147 144   CALCIUM mg/dL 8 9 8 9 Results from last 7 days   Lab Units 09/06/21  0512 09/05/21  1310   AST U/L 42 69*   ALT U/L 136* 185*   ALK PHOS U/L 64 68   TOTAL PROTEIN g/dL 7 7 8 2   ALBUMIN g/dL 3 0* 3 3*   TOTAL BILIRUBIN mg/dL 1 29* 1 65*         Results from last 7 days   Lab Units 09/06/21  0512 09/05/21  1310   GLUCOSE RANDOM mg/dL 96 116       Results from last 7 days   Lab Units 09/05/21  1310   TROPONIN I ng/mL <0 02     Results from last 7 days   Lab Units 09/05/21  1310   D-DIMER QUANTITATIVE ug/ml FEU 3 81*     Results from last 7 days   Lab Units 09/06/21  0817 09/05/21  2332 09/05/21  1310   PROTIME seconds  --   --  13 9   INR   --   --  1 12   PTT seconds 62* 138* 29     Results from last 7 days   Lab Units 09/05/21  1310   TSH 3RD GENERATON uIU/mL 0 264*     Medications:   Scheduled Medications: None     Continuous IV Infusions:  heparin (porcine), 3-30 Units/kg/hr (Order-Specific), Intravenous, Titrated      PRN Meds:  acetaminophen, 650 mg, Oral, Q6H PRN  heparin (porcine), 4,400 Units, Intravenous, Q1H PRN  heparin (porcine), 8,800 Units, Intravenous, Q1H PRN  ibuprofen, 400 mg, Oral, Q6H PRN  ketorolac, 30 mg, Intravenous, Q6H PRN        Discharge Plan: D    Network Utilization Review Department  ATTENTION: Please call with any questions or concerns to [phone] and carefully listen to the prompts so that you are directed to the right person  All voicemails are confidential   Eric Gtz all requests for admission clinical reviews, approved or denied determinations and any other requests to dedicated fax number below belonging to the campus where the patient is receiving treatment   List of dedicated fax numbers for the Facilities:  [address] DENIALS (Administrative/Medical Necessity) [phone]   [address] (Maternity/NICU/Pediatrics) [phone]   [address] [place] [address] 6484 [facility] 1111 [address] [place] [address],[address] Floor [phone]   Barbara Henderson Noam [facility] 6086 [address] [place] [address] David Fatima [facility] [phone] [place] [address] [phone]

## 2021-09-06 NOTE — QUICK NOTE
I am not directly involved in this patients care but was asked by nursing staff about calling the family for an update regarding vital signs and labs  Attempted to call [address] [place] [address] on [phone] - No answer

## 2021-09-07 ENCOUNTER — APPOINTMENT (INPATIENT)
Dept: RADIOLOGY | Facility: HOSPITAL | Age: 26
DRG: 177 | End: 2021-09-07
Payer: COMMERCIAL

## 2021-09-07 LAB
ALBUMIN SERPL BCP-MCNC: 2.5 G/DL (ref 3.5–5)
ALP SERPL-CCNC: 66 U/L (ref 46–116)
ALT SERPL W P-5'-P-CCNC: 80 U/L (ref 12–78)
ANION GAP SERPL CALCULATED.3IONS-SCNC: 10 MMOL/L (ref 4–13)
APTT PPP: 69 SECONDS (ref 23–37)
AST SERPL W P-5'-P-CCNC: 26 U/L (ref 5–45)
B2 GLYCOPROT1 IGA SERPL IA-ACNC: 2.3
B2 GLYCOPROT1 IGG SERPL IA-ACNC: 1.6
B2 GLYCOPROT1 IGM SERPL IA-ACNC: <2.9
BILIRUB SERPL-MCNC: 1.19 MG/DL (ref 0.2–1)
BUN SERPL-MCNC: 8 MG/DL (ref 5–25)
CALCIUM ALBUM COR SERPL-MCNC: 9.9 MG/DL (ref 8.3–10.1)
CALCIUM SERPL-MCNC: 8.7 MG/DL (ref 8.3–10.1)
CARDIOLIPIN IGA SER IA-ACNC: 7
CARDIOLIPIN IGG SER IA-ACNC: 6.1
CARDIOLIPIN IGM SER IA-ACNC: 7.6
CHLORIDE SERPL-SCNC: 93 MMOL/L (ref 100–108)
CO2 SERPL-SCNC: 24 MMOL/L (ref 21–32)
CREAT SERPL-MCNC: 0.85 MG/DL (ref 0.6–1.3)
CRP SERPL QL: 455.2 MG/L
ERYTHROCYTE [DISTWIDTH] IN BLOOD BY AUTOMATED COUNT: 12.4 % (ref 11.6–15.1)
GFR SERPL CREATININE-BSD FRML MDRD: 141 ML/MIN/1.73SQ M
GLUCOSE SERPL-MCNC: 113 MG/DL (ref 65–140)
GLUCOSE SERPL-MCNC: 99 MG/DL (ref 65–140)
HCT VFR BLD AUTO: 41.6 % (ref 36.5–49.3)
HGB BLD-MCNC: 15 G/DL (ref 12–17)
MCH RBC QN AUTO: 29.4 PG (ref 26.8–34.3)
MCHC RBC AUTO-ENTMCNC: 36.1 G/DL (ref 31.4–37.4)
MCV RBC AUTO: 82 FL (ref 82–98)
NT-PROBNP SERPL-MCNC: 73 PG/ML
PLATELET # BLD AUTO: 448 THOUSANDS/UL (ref 149–390)
PMV BLD AUTO: 9.1 FL (ref 8.9–12.7)
POTASSIUM SERPL-SCNC: 3.6 MMOL/L (ref 3.5–5.3)
PROT SERPL-MCNC: 7.8 G/DL (ref 6.4–8.2)
RBC # BLD AUTO: 5.1 MILLION/UL (ref 3.88–5.62)
SODIUM SERPL-SCNC: 127 MMOL/L (ref 136–145)
WBC # BLD AUTO: 16.34 THOUSAND/UL (ref 4.31–10.16)

## 2021-09-07 PROCEDURE — 93005 ELECTROCARDIOGRAM TRACING: CPT

## 2021-09-07 PROCEDURE — 83880 ASSAY OF NATRIURETIC PEPTIDE: CPT | Performed by: PHYSICIAN ASSISTANT

## 2021-09-07 PROCEDURE — 85730 THROMBOPLASTIN TIME PARTIAL: CPT | Performed by: INTERNAL MEDICINE

## 2021-09-07 PROCEDURE — 84481 FREE ASSAY (FT-3): CPT | Performed by: PHYSICIAN ASSISTANT

## 2021-09-07 PROCEDURE — 82948 REAGENT STRIP/BLOOD GLUCOSE: CPT

## 2021-09-07 PROCEDURE — 85027 COMPLETE CBC AUTOMATED: CPT | Performed by: PHYSICIAN ASSISTANT

## 2021-09-07 PROCEDURE — 84300 ASSAY OF URINE SODIUM: CPT | Performed by: PHYSICIAN ASSISTANT

## 2021-09-07 PROCEDURE — 84439 ASSAY OF FREE THYROXINE: CPT | Performed by: PHYSICIAN ASSISTANT

## 2021-09-07 PROCEDURE — 99233 SBSQ HOSP IP/OBS HIGH 50: CPT | Performed by: PHYSICIAN ASSISTANT

## 2021-09-07 PROCEDURE — 86140 C-REACTIVE PROTEIN: CPT | Performed by: PHYSICIAN ASSISTANT

## 2021-09-07 PROCEDURE — 83935 ASSAY OF URINE OSMOLALITY: CPT | Performed by: PHYSICIAN ASSISTANT

## 2021-09-07 PROCEDURE — 71045 X-RAY EXAM CHEST 1 VIEW: CPT

## 2021-09-07 PROCEDURE — 82436 ASSAY OF URINE CHLORIDE: CPT | Performed by: PHYSICIAN ASSISTANT

## 2021-09-07 PROCEDURE — 80053 COMPREHEN METABOLIC PANEL: CPT | Performed by: PHYSICIAN ASSISTANT

## 2021-09-07 RX ORDER — ACETAMINOPHEN 325 MG/1
975 TABLET ORAL EVERY 8 HOURS SCHEDULED
Status: DISCONTINUED | OUTPATIENT
Start: 2021-09-07 | End: 2021-09-10 | Stop reason: HOSPADM

## 2021-09-07 RX ORDER — ONDANSETRON 2 MG/ML
4 INJECTION INTRAMUSCULAR; INTRAVENOUS EVERY 8 HOURS PRN
Status: DISCONTINUED | OUTPATIENT
Start: 2021-09-07 | End: 2021-09-10 | Stop reason: HOSPADM

## 2021-09-07 RX ORDER — LIDOCAINE 50 MG/G
1 PATCH TOPICAL DAILY
Status: DISCONTINUED | OUTPATIENT
Start: 2021-09-07 | End: 2021-09-10 | Stop reason: HOSPADM

## 2021-09-07 RX ORDER — SODIUM CHLORIDE 9 MG/ML
75 INJECTION, SOLUTION INTRAVENOUS CONTINUOUS
Status: DISCONTINUED | OUTPATIENT
Start: 2021-09-07 | End: 2021-09-09

## 2021-09-07 RX ADMIN — SODIUM CHLORIDE 75 ML/HR: 0.9 INJECTION, SOLUTION INTRAVENOUS at 21:25

## 2021-09-07 RX ADMIN — ACETAMINOPHEN 975 MG: 325 TABLET, FILM COATED ORAL at 21:23

## 2021-09-07 RX ADMIN — ONDANSETRON 4 MG: 2 INJECTION INTRAMUSCULAR; INTRAVENOUS at 13:44

## 2021-09-07 RX ADMIN — ACETAMINOPHEN 650 MG: 325 TABLET, FILM COATED ORAL at 05:06

## 2021-09-07 RX ADMIN — LIDOCAINE 5% 1 PATCH: 700 PATCH TOPICAL at 10:54

## 2021-09-07 RX ADMIN — ACETAMINOPHEN 975 MG: 325 TABLET, FILM COATED ORAL at 13:43

## 2021-09-07 RX ADMIN — CEFTRIAXONE SODIUM 1000 MG: 10 INJECTION, POWDER, FOR SOLUTION INTRAVENOUS at 16:13

## 2021-09-07 RX ADMIN — APIXABAN 10 MG: 5 TABLET, FILM COATED ORAL at 16:07

## 2021-09-07 RX ADMIN — KETOROLAC TROMETHAMINE 30 MG: 30 INJECTION, SOLUTION INTRAMUSCULAR; INTRAVENOUS at 03:20

## 2021-09-07 RX ADMIN — TRAMADOL HYDROCHLORIDE 50 MG: 50 TABLET, FILM COATED ORAL at 19:30

## 2021-09-07 NOTE — ASSESSMENT & PLAN NOTE
· Patient originally diagnosed on 8/26, imaging on presentation with infiltrates consistent with COVID-19 pneumonia  · MILD PATHWAY  · Currently, no symptoms of shortness of breath but has pleuritic pain as noted above  · No indication for remdesivir - outside window, will consider if significant hypoxia  · No indication for convalescent plasma - outside window  · No indication for acetmra - check CRP now  · Monitor O2 saturation, 93-97% on room air  · Patient is anticoagulated with heparin drip currently due to PE   · Encourage ambulation, incentive spirometry, self-prone position   · Supportive care

## 2021-09-07 NOTE — PLAN OF CARE
Problem: Potential for Falls  Goal: Patient will remain free of falls  Description: INTERVENTIONS:  - Educate patient/family on patient safety including physical limitations  - Instruct patient to call for assistance with activity   - Consult OT/PT to assist with strengthening/mobility   - Keep Call bell within reach  - Keep bed low and locked with side rails adjusted as appropriate  - Keep care items and personal belongings within reach  - Initiate and maintain comfort rounds  - Make Fall Risk Sign visible to staff  - Offer Toileting every  Hours, in advance of need  - Initiate/Maintain alarm  - Obtain necessary fall risk management equipment:   - Apply yellow socks and bracelet for high fall risk patients  - Consider moving patient to room near nurses station  Outcome: Progressing     Problem: PAIN - ADULT  Goal: Verbalizes/displays adequate comfort level or baseline comfort level  Description: Interventions:  - Encourage patient to monitor pain and request assistance  - Assess pain using appropriate pain scale  - Administer analgesics based on type and severity of pain and evaluate response  - Implement non-pharmacological measures as appropriate and evaluate response  - Consider cultural and social influences on pain and pain management  - Notify physician/advanced practitioner if interventions unsuccessful or patient reports new pain  Outcome: Progressing     Problem: INFECTION - ADULT  Goal: Absence or prevention of progression during hospitalization  Description: INTERVENTIONS:  - Assess and monitor for signs and symptoms of infection  - Monitor lab/diagnostic results  - Monitor all insertion sites, i e  indwelling lines, tubes, and drains  - Monitor endotracheal if appropriate and nasal secretions for changes in amount and color  - [place] appropriate cooling/warming therapies per order  - Administer medications as ordered  - Instruct and encourage patient and family to use good hand hygiene technique  - Identify and instruct in appropriate isolation precautions for identified infection/condition  Outcome: Progressing  Goal: Absence of fever/infection during neutropenic period  Description: INTERVENTIONS:  - Monitor WBC    Outcome: Progressing     Problem: SAFETY ADULT  Goal: Maintain or return to baseline ADL function  Description: INTERVENTIONS:  -  Assess patient's ability to carry out ADLs; assess patient's baseline for ADL function and identify physical deficits which impact ability to perform ADLs (bathing, care of mouth/teeth, toileting, grooming, dressing, etc )  - Assess/evaluate cause of self-care deficits   - Assess range of motion  - Assess patient's mobility; develop plan if impaired  - Assess patient's need for assistive devices and provide as appropriate  - Encourage maximum independence but intervene and supervise when necessary  - Involve family in performance of ADLs  - Assess for home care needs following discharge   - Consider OT consult to assist with ADL evaluation and planning for discharge  - Provide patient education as appropriate  Outcome: Progressing     Problem: SAFETY ADULT  Goal: Maintains/Returns to pre admission functional level  Description: INTERVENTIONS:  - Perform BMAT or MOVE assessment daily    - Set and communicate daily mobility goal to care team and patient/family/caregiver  - Collaborate with rehabilitation services on mobility goals if consulted  - Perform Range of Motion  times a day  - Reposition patient every  hours    - Dangle patient  times a day  - Stand patient  times a day  - Ambulate patient  times a day  - Out of bed to chair  times a day   - Out of bed for meals  times a day  - Out of bed for toileting  - Record patient progress and toleration of activity level   Outcome: Progressing     Problem: DISCHARGE PLANNING  Goal: Discharge to home or other facility with appropriate resources  Description: INTERVENTIONS:  - Identify barriers to discharge w/patient and caregiver  - Arrange for needed discharge resources and transportation as appropriate  - Identify discharge learning needs (meds, wound care, etc )  - Arrange for interpretive services to assist at discharge as needed  - Refer to Case Management Department for coordinating discharge planning if the patient needs post-hospital services based on physician/advanced practitioner order or complex needs related to functional status, cognitive ability, or social support system  Outcome: Progressing     Problem: Knowledge Deficit  Goal: Patient/family/caregiver demonstrates understanding of disease process, treatment plan, medications, and discharge instructions  Description: Complete learning assessment and assess knowledge base    Interventions:  - Provide teaching at level of understanding  - Provide teaching via preferred learning methods  Outcome: Progressing     Problem: HEMATOLOGIC - ADULT  Goal: Maintains hematologic stability  Description: INTERVENTIONS  - Assess for signs and symptoms of bleeding or hemorrhage  - Monitor labs  - Administer supportive blood products/factors as ordered and appropriate  Outcome: Progressing     Problem: MOBILITY - ADULT  Goal: Maintain or return to baseline ADL function  Description: INTERVENTIONS:  -  Assess patient's ability to carry out ADLs; assess patient's baseline for ADL function and identify physical deficits which impact ability to perform ADLs (bathing, care of mouth/teeth, toileting, grooming, dressing, etc )  - Assess/evaluate cause of self-care deficits   - Assess range of motion  - Assess patient's mobility; develop plan if impaired  - Assess patient's need for assistive devices and provide as appropriate  - Encourage maximum independence but intervene and supervise when necessary  - Involve family in performance of ADLs  - Assess for home care needs following discharge   - Consider OT consult to assist with ADL evaluation and planning for discharge  - Provide patient education as appropriate  Outcome: Progressing  Goal: Maintains/Returns to pre admission functional level  Description: INTERVENTIONS:  - Perform BMAT or MOVE assessment daily    - Set and communicate daily mobility goal to care team and patient/family/caregiver  - Collaborate with rehabilitation services on mobility goals if consulted  - Perform Range of Motion  times a day  - Reposition patient every  hours    - Dangle patient  times a day  - Stand patient times a day  - Ambulate patient  times a day  - Out of bed to chair  times a day   - Out of bed for meals  times a day  - Out of bed for toileting  - Record patient progress and toleration of activity level   Outcome: Progressing

## 2021-09-07 NOTE — CASE MANAGEMENT
Case Management Assessment & Discharge Planning Note    Patient name Johan Melton  Location [facility] [address] 230/- MRN [id number]  : 1995 Date 2021       Current Admission Date: 2021  Current Admission Diagnosis:  Pulmonary embolism [facility])   Patient Active Problem List   Diagnosis    COVID-19    Pulmonary embolism ([facility] [address] )    Elevated liver enzymes    Hyponatremia    Previous Admission - Discharge Date:    LOS (days): 1  Geometric Mean LOS (GMLOS) (days):   Days to GMLOS: Previous Discharge Diagnosis:  No discharge information exists for this patient  Risk of Unplanned Readmission Score  Predictive Model Details          6 (Low)  Factor Value    Calculated 2021 16:04 31% Active NSAID Rx order present    Risk of Unplanned Readmission Model 15% ECG/EKG order present in last 6 months     12% Diagnosis of electrolyte disorder present     11% Number of active Rx orders 10     11% Imaging order present in last 6 months     9% Number of ED visits in last six months 1     7% Active anticoagulant Rx order present     3% Age 26     2% Current length of stay 1 181 days         BUNDLE:      Reason for Referral:    OBJECTIVE:  Pt is a 27y o  year old Single, black or  [2], male with Orthodox preference of None admitted on 2021 12:45 PM  Pt is admitted to [facility] 230- at [facility] with complaints of Pulmonary embolism ([facility] [place] )     Current admission status: Inpatient       Preferred Pharmacy:   PATIENT/FAMILY REPORTS NO PREFERRED PHARMACY  No address on file      [address] [place] [address] - Sergei [address]  [address] [address]  Phone: [phone] Fax: [phone]    Primary Care Provider: Viet Greenwood MD    Primary Insurance:   Secondary Insurance:     ASSESSMENT:  [address] [facility], [address] Representative - Mother   Primary Phone: [phone] (Mobile)               Advance Directives  Does patient have a [address]?: Yes  Does patient have Advance Directives?: Yes  Advance Directives: Living will         Morales [address] of Residence: [place]    Readmission Root Cause  30 Day Readmission: No    Patient Information  Mental Status: Alert  During Assessment patient was accompanied by: Not accompanied during assessment  Assessment information provided by[de-identified] Patient  Primary Caregiver: Self  Support Systems: Other- Comment Ex  Anglican, community, neighbor, etc  (mother-Juany)  What [place] you [place] [address] [facility] [address] [facility] access options   Select all that apply : Stairs  Number of steps to enter home : One Flight  Type of Current Residence: 2 story home  Upon entering residence, is there a bedroom on the main floor (no further steps)?: Yes  Upon entering residence, is there a bathroom on the main floor (no further steps)?: Yes  Living Arrangements: Lives w/ Parent(s) (mother-Juany)  Is patient a ?: No    Activities of Daily Living Prior to Admission  Functional Status: Independent  Completes ADLs independently?: Yes  Ambulates independently?: Yes  Does patient use assisted devices?: No  Does patient currently own DME?: No  Does patient have a history of Outpatient Therapy (PT/OT)?: No  Does the patient have a history of Short-Term Rehab?: No  Does patient currently have [facility]?: No         Patient Information Continued  Income Source: Employed  Does patient have prescription coverage?: Yes (patient has Yesenia Goad)  Does patient receive dialysis treatments?: No  Does patient have a history of substance abuse?: No  Does patient have a history of Mental Health Diagnosis?: No         Means of Transportation  Means of Transport to Appts[de-identified] Family transport  In the past 12 months, has lack of transportation kept you from medical appointments or from getting medications?: No  In the past 12 months, has lack of transportation kept you from meetings, work, or from getting things needed for daily living?: No  Was application for public transport provided?: No    DISCHARGE DETAILS:    Discharge planning discussed with[de-identified] patient and mother- Jessica Faria of Choice: Yes (patient requested this CM call mother Arnulfo Squires to discuss how to obtain insurance his insurance card )    Contacts  Patient Contacts: mother-celi Lucastionship to Patient[de-identified] Family  Contact Method: Phone  Phone Number: [phone]  Reason/Outcome: Continuity of Care, Emergency Contact, Discharge [address]         Is the patient interested in [facility] at discharge?: No    DME Referral Provided  Referral made for DME?: No         We would like to be able to fill any required prescriptions on discharge at our [address] [place] [address] and have them delivered to you at discharge in your room    Would you like to participate in this program? : No - Declined    Discharge Destination Plan[de-identified] Home  Transportation at Discharge?: Yes  Type of Transport: Auto with designated  (alex)  Dispatcher Called: No

## 2021-09-07 NOTE — ASSESSMENT & PLAN NOTE
· Present on admission; likely in setting of covid-19 infection  · No pain to palpation of right upper quadrant on exam  · AST/ALT; 69/185; Bilirubin 1 69 on admission  · Chronic hepatitis panel negative  · Trending down

## 2021-09-07 NOTE — PROGRESS NOTES
[facility] Progress Note - Jonah Shah 1995, 27 y o  male MRN: [id number]  Unit/Bed#: -01 Encounter: [id number]  Primary Care Provider: Quinn Holliday MD   Date and time admitted to hospital: 9/5/2021 12:45 PM    * Pulmonary embolism [facility])  Assessment & Plan  · Patient came in with pleuritic chest pain that started last night with nausea, secondary to pain  · Etiology likely secondary to COVID; no family history of blood clots  · D-dimer checked and noted to be elevated  · CTA PE study-multiple right lung segmental pulmonary emboli; no RV strain  Also noted bilateral pneumonia compatible with COVID-19 infection  · Continues with pleuritic chest pain and nausea, likely compounded by pain and medications  · Currently on heparin drip; will continue at this time and price check DOAC  · Continue with supplemental O2 if needed  · Continue with pain control as needed (lidoderm, acetaminophen, tramadol)  · Avoid NSAIDs   · Thrombosis panel pending  · Patient will need follow-up with primary care provider and possibly Hematology referral on discharge vs pulmonology referral for follow up     [address] [place] [address]  · Patient originally diagnosed on 8/26, imaging on presentation with infiltrates consistent with COVID-19 pneumonia  · MILD PATHWAY  · Currently, no symptoms of shortness of breath but has pleuritic pain as noted above  · No indication for remdesivir - outside window, will consider if significant hypoxia  · No indication for convalescent plasma - outside window  · No indication for acetmra - check CRP now  · Monitor O2 saturation, 93-97% on room air  · Patient is anticoagulated with heparin drip currently due to PE   · Encourage ambulation, incentive spirometry, self-prone position   · Supportive care     Elevated liver enzymes  Assessment & Plan  · Present on admission; likely in setting of covid-19 infection    · No pain to palpation of right upper quadrant on exam  · AST/ALT; 69/185; Bilirubin 1 69 on admission  · Chronic hepatitis panel negative  · Trending down  Hyponatremia  Assessment & Plan  · Present on admission; likely in setting of covid-19 infection  · Improved, monitor           VTE Pharmacologic Prophylaxis: VTE Score: 4 Moderate Risk (Score 3-4) - Pharmacological DVT Prophylaxis Ordered: heparin drip  Patient Centered Rounds: I evaluated the patient without nursing staff present due to COVID-19 precautions  Discussions with Specialists or Other Care Team Provider: BETH     Education and Discussions with Family / Patient: Updated  (mother and sister) via phone  Time Spent for Care: 60 minutes  More than 50% of total time spent on counseling and coordination of care as described above  Current Length of Stay: 1 day(s)  Current Patient Status: Inpatient   Certification Statement: The patient will continue to require additional inpatient hospital stay due to acute PE, COVID-19 pneumonia, IV heparin drip  Discharge Plan: Anticipate discharge in 24-48 hrs to home  Code Status: Level 1 - Full Code    Subjective:   Patient seen this am, we called his mother and sister from the room  He continues to have the right sided chest pain, worse with inspiration and movement to 8/10 max  The pain meds do help, but they made him nauseous  He denies palpitations or shortness of breath  He wants to feel better  Sister and mother had a lot of questions which I answered to the best of my ability by phone at this time  Mom states she just wants son to get better and come home when he is ready and it is safe  Objective:     Vitals:   Temp (24hrs), Av 2 °F (37 9 °C), Min:98 4 °F (36 9 °C), Max:102 2 °F (39 °C)    Temp:  [98 4 °F (36 9 °C)-102 2 °F (39 °C)] 101 1 °F (38 4 °C)  HR:  [100-134] 134  Resp:  [18-30] 30  BP: (122-147)/(78-88) 147/88  SpO2:  [83 %-96 %] 93 %  Body mass index is 30 25 kg/m²       Input and Output Summary (last 24 hours): Intake/Output Summary (Last 24 hours) at 9/7/2021 1405  Last data filed at 9/7/2021 1340  Gross per 24 hour   Intake 480 ml   Output 2700 ml   Net -2220 ml       Physical Exam:   Physical Exam  Vitals and nursing note reviewed  Constitutional:       General: He is not in acute distress  Appearance: He is obese  He is not ill-appearing, toxic-appearing or diaphoretic  Cardiovascular:      Rate and Rhythm: Regular rhythm  Tachycardia present  Heart sounds: Normal heart sounds  No murmur heard  Pulmonary:      Effort: Pulmonary effort is normal       Breath sounds: Examination of the right-lower field reveals rales  Examination of the left-lower field reveals rales  Rales (R>L) present  Abdominal:      General: Bowel sounds are normal  There is no distension  Palpations: Abdomen is soft  Tenderness: There is no abdominal tenderness  There is no guarding  Musculoskeletal:         General: No tenderness  Right lower leg: No edema  Left lower leg: No edema  Neurological:      Mental Status: He is alert and oriented to person, place, and time     Psychiatric:         Mood and Affect: Mood normal          Behavior: Behavior normal            Additional Data:     Labs:  Results from last 7 days   Lab Units 09/06/21  0512 09/05/21  1310   WBC Thousand/uL 12 79* 10 47*   HEMOGLOBIN g/dL 15 2 16 6   HEMATOCRIT % 42 7 45 9   PLATELETS Thousands/uL 373 363   NEUTROS PCT %  --  75   LYMPHS PCT %  --  11*   MONOS PCT %  --  13*   EOS PCT %  --  0     Results from last 7 days   Lab Units 09/06/21  0512   SODIUM mmol/L 133*   POTASSIUM mmol/L 3 5   CHLORIDE mmol/L 97*   CO2 mmol/L 23   BUN mg/dL 10   CREATININE mg/dL 0 77   ANION GAP mmol/L 13   CALCIUM mg/dL 8 9   ALBUMIN g/dL 3 0*   TOTAL BILIRUBIN mg/dL 1 29*   ALK PHOS U/L 64   ALT U/L 136*   AST U/L 42   GLUCOSE RANDOM mg/dL 96     Results from last 7 days   Lab Units 09/05/21  1310   INR  1 12     Results from last 7 days Lab Units 09/07/21  1132   POC GLUCOSE mg/dl 99               Lines/Drains:  Invasive Devices     Peripheral Intravenous Line            Peripheral IV 09/05/21 Left Antecubital 2 days                      Imaging: Reviewed radiology reports from this admission including: chest xray and chest CT scan and Personally reviewed the following imaging: chest CT scan    Recent Cultures (last 7 days):         Last 24 Hours Medication List:   Current Facility-Administered Medications   Medication Dose Route Frequency Provider Last Rate    acetaminophen  650 mg Oral Q6H PRN Safia Spindle, DO      acetaminophen  975 mg Oral Q8H [facility] Christine Morales PA-C      heparin (porcine)  3-30 Units/kg/hr (Order-Specific) Intravenous Titrated Safia Spindle, DO 15 Units/kg/hr (09/07/21 1109)    heparin (porcine)  4,400 Units Intravenous Q1H PRN Safia Spindle, DO      heparin (porcine)  8,800 Units Intravenous Q1H PRN Safia Spindle, DO      ibuprofen  400 mg Oral Q6H PRN Safia Spindle, DO      lidocaine  1 patch Topical Daily Lakisha Hendrix PA-C      ondansetron  4 mg Intravenous Q8H PRN Lakisha Hendrix PA-C      traMADol  50 mg Oral Q6H PRN CHANCE Peacock          Today, Patient Was Seen By: Christine Morales PA-C    **Please Note: This note may have been constructed using a voice recognition system  **

## 2021-09-08 LAB
ANION GAP SERPL CALCULATED.3IONS-SCNC: 7 MMOL/L (ref 4–13)
APTT PPP: 39 SECONDS (ref 23–37)
ATRIAL RATE: 115 BPM
BASOPHILS # BLD AUTO: 0.04 THOUSANDS/ΜL (ref 0–0.1)
BASOPHILS NFR BLD AUTO: 0 % (ref 0–1)
BUN SERPL-MCNC: 7 MG/DL (ref 5–25)
CALCIUM SERPL-MCNC: 9.3 MG/DL (ref 8.3–10.1)
CHLORIDE SERPL-SCNC: 96 MMOL/L (ref 100–108)
CHLORIDE UR-SCNC: <10 MMOL/L
CO2 SERPL-SCNC: 29 MMOL/L (ref 21–32)
CREAT SERPL-MCNC: 0.8 MG/DL (ref 0.6–1.3)
EOSINOPHIL # BLD AUTO: 0.01 THOUSAND/ΜL (ref 0–0.61)
EOSINOPHIL NFR BLD AUTO: 0 % (ref 0–6)
ERYTHROCYTE [DISTWIDTH] IN BLOOD BY AUTOMATED COUNT: 12.6 % (ref 11.6–15.1)
GFR SERPL CREATININE-BSD FRML MDRD: 145 ML/MIN/1.73SQ M
GLUCOSE SERPL-MCNC: 95 MG/DL (ref 65–140)
HCT VFR BLD AUTO: 41.7 % (ref 36.5–49.3)
HGB BLD-MCNC: 14.8 G/DL (ref 12–17)
IMM GRANULOCYTES # BLD AUTO: 0.2 THOUSAND/UL (ref 0–0.2)
IMM GRANULOCYTES NFR BLD AUTO: 1 % (ref 0–2)
LYMPHOCYTES # BLD AUTO: 1.3 THOUSANDS/ΜL (ref 0.6–4.47)
LYMPHOCYTES NFR BLD AUTO: 8 % (ref 14–44)
MCH RBC QN AUTO: 29.7 PG (ref 26.8–34.3)
MCHC RBC AUTO-ENTMCNC: 35.5 G/DL (ref 31.4–37.4)
MCV RBC AUTO: 84 FL (ref 82–98)
MONOCYTES # BLD AUTO: 1.31 THOUSAND/ΜL (ref 0.17–1.22)
MONOCYTES NFR BLD AUTO: 8 % (ref 4–12)
NEUTROPHILS # BLD AUTO: 12.66 THOUSANDS/ΜL (ref 1.85–7.62)
NEUTS SEG NFR BLD AUTO: 83 % (ref 43–75)
NRBC BLD AUTO-RTO: 0 /100 WBCS
OSMOLALITY UR/SERPL-RTO: 279 MMOL/KG (ref 282–298)
OSMOLALITY UR: 231 MMOL/KG
P AXIS: 46 DEGREES
PLATELET # BLD AUTO: 462 THOUSANDS/UL (ref 149–390)
PMV BLD AUTO: 9.1 FL (ref 8.9–12.7)
POTASSIUM SERPL-SCNC: 3.9 MMOL/L (ref 3.5–5.3)
PR INTERVAL: 152 MS
PROCALCITONIN SERPL-MCNC: 0.06 NG/ML
PROT S ACT/NOR PPP: 34 % (ref 61–136)
PROT S PPP-ACNC: 92 % (ref 60–150)
QRS AXIS: 60 DEGREES
QRSD INTERVAL: 100 MS
QT INTERVAL: 318 MS
QTC INTERVAL: 439 MS
RBC # BLD AUTO: 4.99 MILLION/UL (ref 3.88–5.62)
SODIUM 24H UR-SCNC: 10 MOL/L
SODIUM SERPL-SCNC: 132 MMOL/L (ref 136–145)
T WAVE AXIS: 23 DEGREES
T3FREE SERPL-MCNC: 1.69 PG/ML (ref 2.3–4.2)
T4 FREE SERPL-MCNC: 1.38 NG/DL (ref 0.76–1.46)
VENTRICULAR RATE: 115 BPM
WBC # BLD AUTO: 15.52 THOUSAND/UL (ref 4.31–10.16)

## 2021-09-08 PROCEDURE — 93010 ELECTROCARDIOGRAM REPORT: CPT | Performed by: INTERNAL MEDICINE

## 2021-09-08 PROCEDURE — 85025 COMPLETE CBC W/AUTO DIFF WBC: CPT | Performed by: PHYSICIAN ASSISTANT

## 2021-09-08 PROCEDURE — 85730 THROMBOPLASTIN TIME PARTIAL: CPT | Performed by: INTERNAL MEDICINE

## 2021-09-08 PROCEDURE — 80048 BASIC METABOLIC PNL TOTAL CA: CPT | Performed by: PHYSICIAN ASSISTANT

## 2021-09-08 PROCEDURE — 99232 SBSQ HOSP IP/OBS MODERATE 35: CPT | Performed by: PHYSICIAN ASSISTANT

## 2021-09-08 PROCEDURE — 84145 PROCALCITONIN (PCT): CPT | Performed by: PHYSICIAN ASSISTANT

## 2021-09-08 PROCEDURE — 83930 ASSAY OF BLOOD OSMOLALITY: CPT | Performed by: PHYSICIAN ASSISTANT

## 2021-09-08 RX ADMIN — ACETAMINOPHEN 975 MG: 325 TABLET, FILM COATED ORAL at 13:16

## 2021-09-08 RX ADMIN — ACETAMINOPHEN 975 MG: 325 TABLET, FILM COATED ORAL at 21:40

## 2021-09-08 RX ADMIN — ACETAMINOPHEN 975 MG: 325 TABLET, FILM COATED ORAL at 05:11

## 2021-09-08 RX ADMIN — LIDOCAINE 5% 1 PATCH: 700 PATCH TOPICAL at 10:13

## 2021-09-08 RX ADMIN — CEFTRIAXONE SODIUM 1000 MG: 10 INJECTION, POWDER, FOR SOLUTION INTRAVENOUS at 17:00

## 2021-09-08 RX ADMIN — APIXABAN 10 MG: 5 TABLET, FILM COATED ORAL at 18:21

## 2021-09-08 RX ADMIN — APIXABAN 10 MG: 5 TABLET, FILM COATED ORAL at 10:13

## 2021-09-08 RX ADMIN — SODIUM CHLORIDE 75 ML/HR: 0.9 INJECTION, SOLUTION INTRAVENOUS at 11:22

## 2021-09-08 NOTE — ASSESSMENT & PLAN NOTE
· Patient originally diagnosed on 8/26, imaging on presentation with infiltrates consistent with COVID-19 pneumonia  · MILD PATHWAY  · Currently, no symptoms of shortness of breath but has pleuritic pain as noted above  · No indication for remdesivir - outside window, will consider if significant hypoxia  · No indication for convalescent plasma - outside window  · No indication for acetmra   · No indication for corticosteroids - O2 sats are stable   · Monitor O2 saturation, 93-97% on room air  · Anticoagulation with eliquis due to PE   · Encourage ambulation, incentive spirometry, self-prone position   · Supportive care

## 2021-09-08 NOTE — CASE MANAGEMENT
Case Management Progress Note    Patient name Karlo Maurer  Location /-01 MRN [id number]  : 1995 Date 2021       LOS (days): 2  Geometric Mean LOS (GMLOS) (days):   Days to GMLOS:        BUNDLE:      OBJECTIVE:  Pt is a 27y o  year old Single, black or  [2], male with Gnosticism preference of None admitted on 2021 12:45 PM  Pt is admitted to [facility] 87 230- at [address] [place] [address] with complaints of Pulmonary embolism ([facility] [address] )   Current admission status: Inpatient  Preferred Pharmacy:   PATIENT/FAMILY REPORTS NO PREFERRED PHARMACY  No address on file      [address] [place] [address] - [facility] [address] [place] [address]  Phone: [phone] Fax: [phone]    Primary Care Provider: Jayne Messina MD    Primary Insurance:   Secondary Insurance:     PROGRESS NOTE:    CM received patient's insurance card from patient's mother-celi who came in to give the card  Patient provided his passport as ID  CM made copy of both for the chart and emailed to insurance verification both documents

## 2021-09-08 NOTE — PROGRESS NOTES
[facility] Progress Note - Shira Godoy 1995, 27 y o  male MRN: [id number]  Unit/Bed#: -Thom Encounter: [id number]  Primary Care Provider: Annamarie Silveira MD   Date and time admitted to hospital: 9/5/2021 12:45 PM    * Pulmonary embolism [facility])  Assessment & Plan  · Patient came in with pleuritic chest pain that started last night with nausea, secondary to pain  · Etiology likely secondary to COVID; no family history of blood clots  · D-dimer checked and noted to be elevated  · CTA PE study-multiple right lung segmental pulmonary emboli; no RV strain  Also noted bilateral pneumonia compatible with COVID-19 infection  · Continues with pleuritic chest pain and nausea, likely compounded by pain and medications  · Transitioned to eliquis 10 mg bid x 7 days then 5 mg bid x 3 months  · Continue with supplemental O2 if needed - currently on room air  · Continue with pain control as needed (lidoderm, acetaminophen, tramadol)  · Avoid NSAIDs   · Thrombosis panel pending  · Patient will need follow-up with primary care provider and possibly Hematology referral on discharge vs pulmonology referral for follow up     [address] [place] [address]  · Patient originally diagnosed on 8/26, imaging on presentation with infiltrates consistent with COVID-19 pneumonia  · MILD PATHWAY  · Currently, no symptoms of shortness of breath but has pleuritic pain as noted above  · No indication for remdesivir - outside window, will consider if significant hypoxia  · No indication for convalescent plasma - outside window  · No indication for acetmra   · No indication for corticosteroids - O2 sats are stable   · Monitor O2 saturation, 93-97% on room air  · Anticoagulation with eliquis due to PE   · Encourage ambulation, incentive spirometry, self-prone position   · Supportive care     Elevated liver enzymes  Assessment & Plan  · Present on admission; likely in setting of covid-19 infection    · No pain to palpation of right upper quadrant on exam  · AST/ALT; 69/185; Bilirubin 1 69 on admission  · Chronic hepatitis panel negative  · Trending down  Hyponatremia  Assessment & Plan  · Present on admission; likely in setting of covid-19 infection  · Improved, monitor on IVF today        VTE Pharmacologic Prophylaxis: VTE Score: 4 Moderate Risk (Score 3-4) - Pharmacological DVT Prophylaxis Ordered: apixaban (Eliquis)  Patient Centered Rounds: I evaluated the patient without nursing staff present due to [address] with Specialists or Other Care Team Provider: BETH     Education and Discussions with Family / Patient: Updated  (mother) via phone  Time Spent for Care: 30 minutes  More than 50% of total time spent on counseling and coordination of care as described above  Current Length of Stay: 2 day(s)  Current Patient Status: Inpatient   Certification Statement: The patient will continue to require additional inpatient hospital stay due to monitor O2 sats and pain control today  Discharge Plan: Anticipate discharge tomorrow to home  Code Status: Level 1 - Full Code    Subjective:   Patient seen this morning, reports feeling so much better today  He is agreeable to monitor overnight in probable discharge home tomorrow  Reports chest pain is much better  Denies shortness of breath  No fevers or chills overnight  He is trying to ambulate in the room and does note that he had a desaturation episode yesterday with that  Objective:     Vitals:   Temp (24hrs), Av 3 °F (37 4 °C), Min:98 1 °F (36 7 °C), Max:101 1 °F (38 4 °C)    Temp:  [98 1 °F (36 7 °C)-101 1 °F (38 4 °C)] 98 2 °F (36 8 °C)  HR:  [108-134] 109  Resp:  [21-30] 21  BP: (126-129)/(74-76) 128/74  SpO2:  [83 %-95 %] 91 %  Body mass index is 30 25 kg/m²  Input and Output Summary (last 24 hours):      Intake/Output Summary (Last 24 hours) at 2021 1026  Last data filed at 2021 0146  Gross per 24 hour   Intake 2160 ml   Output 2150 ml   Net 10 ml       Physical Exam:   Physical Exam  Vitals and nursing note reviewed  Constitutional:       General: He is not in acute distress  Appearance: He is obese  He is not ill-appearing or toxic-appearing  Cardiovascular:      Rate and Rhythm: Regular rhythm  Tachycardia present  Pulses: Normal pulses  Heart sounds: Normal heart sounds  No murmur heard  Pulmonary:      Effort: Pulmonary effort is normal       Breath sounds: Examination of the left-lower field reveals decreased breath sounds  Decreased breath sounds (R>L) present  No wheezing or rales  Abdominal:      General: Bowel sounds are normal       Palpations: Abdomen is soft  Neurological:      Mental Status: He is alert and oriented to person, place, and time     Psychiatric:         Mood and Affect: Mood normal          Behavior: Behavior normal            Additional Data:     Labs:  Results from last 7 days   Lab Units 09/08/21  0510   WBC Thousand/uL 15 52*   HEMOGLOBIN g/dL 14 8   HEMATOCRIT % 41 7   PLATELETS Thousands/uL 462*   NEUTROS PCT % 83*   LYMPHS PCT % 8*   MONOS PCT % 8   EOS PCT % 0     Results from last 7 days   Lab Units 09/08/21  0510 09/07/21  1458   SODIUM mmol/L 132* 127*   POTASSIUM mmol/L 3 9 3 6   CHLORIDE mmol/L 96* 93*   CO2 mmol/L 29 24   BUN mg/dL 7 8   CREATININE mg/dL 0 80 0 85   ANION GAP mmol/L 7 10   CALCIUM mg/dL 9 3 8 7   ALBUMIN g/dL  --  2 5*   TOTAL BILIRUBIN mg/dL  --  1 19*   ALK PHOS U/L  --  66   ALT U/L  --  80*   AST U/L  --  26   GLUCOSE RANDOM mg/dL 95 113     Results from last 7 days   Lab Units 09/05/21  1310   INR  1 12     Results from last 7 days   Lab Units 09/07/21  1132   POC GLUCOSE mg/dl 99         Results from last 7 days   Lab Units 09/08/21  0510   PROCALCITONIN ng/ml 0 06       Lines/Drains:  Invasive Devices     Peripheral Intravenous Line            Peripheral IV 09/05/21 Left Antecubital 2 days                      Imaging: Reviewed radiology reports from this admission including: chest xray and Personally reviewed the following imaging: chest xray    Recent Cultures (last 7 days):         Last 24 Hours Medication List:   Current Facility-Administered Medications   Medication Dose Route Frequency Provider Last Rate    acetaminophen  650 mg Oral Q6H PRN Mary Saint James, DO      acetaminophen  975 mg Oral [address] Lakisha Kline PA-C      apixaban  10 mg Oral BID Lakisha Hendrix PA-C      Followed by   Gerson Bravo ON 9/14/2021] apixaban  5 mg Oral BID Lakisha Hendrix PA-C      cefTRIAXone  1,000 mg Intravenous Q24H Lakisha Hendrix PA-C 1,000 mg (09/07/21 1613)    ibuprofen  400 mg Oral Q6H PRN Mary Saint James, DO      lidocaine  1 patch Topical Daily Lakisha Hendrix PA-C      ondansetron  4 mg Intravenous Q8H PRN Lakisha Hendrix PA-C      sodium chloride  75 mL/hr Intravenous Continuous Alie Monson PA-C 75 mL/hr (09/07/21 2125)    traMADol  50 mg Oral Q6H PRN Primo Agudelo PA-C          Today, Patient Was Seen By: Alie Monson PA-C    **Please Note: This note may have been constructed using a voice recognition system  **

## 2021-09-08 NOTE — CASE MANAGEMENT
Case Management Progress Note    Patient name Lary Opitz  Location /-01 MRN [id number]  : 1995 Date 2021       LOS (days): 2  Geometric Mean LOS (GMLOS) (days):   Days to GMLOS:        BUNDLE:      OBJECTIVE:  Pt is a 27y o  year old Single, black or  [2], male with Orthodox preference of None admitted on 2021 12:45 PM  Pt is admitted to [facility] [address] 230-01 at [address] [place] [address] with complaints of Pulmonary embolism ([facility] [address] )   Current admission status: Inpatient  Preferred Pharmacy:   PATIENT/FAMILY REPORTS NO PREFERRED PHARMACY  No address on file      [address], [place] - [place] [address]  Phone: [phone] Fax: [phone]    Primary Care Provider: Jenn Cerda MD    Primary Insurance:   Secondary Insurance:     PROGRESS NOTE:      CM spoke to Clotilde who states she will come to the hospital to bring patient's insurance card to put in the chart  Claudean Quivers states she will be here before 4PM today

## 2021-09-08 NOTE — PROGRESS NOTES
Patient o2 in room 94 % RA    Ambulated  with this nurse:     after 2 min,  O2     93% RA          ,                       3 min            90- 93% RA    HR  150       Returned to bed   O2 at rest 88 RA       2L-O2 applied  See flowsheet

## 2021-09-09 ENCOUNTER — APPOINTMENT (INPATIENT)
Dept: NON INVASIVE DIAGNOSTICS | Facility: HOSPITAL | Age: 26
DRG: 177 | End: 2021-09-09
Payer: COMMERCIAL

## 2021-09-09 PROBLEM — R74.8 ELEVATED LIVER ENZYMES: Status: RESOLVED | Noted: 2021-09-05 | Resolved: 2021-09-09

## 2021-09-09 PROBLEM — R00.0 TACHYCARDIA: Status: ACTIVE | Noted: 2021-09-09

## 2021-09-09 LAB
ALBUMIN SERPL BCP-MCNC: 2.2 G/DL (ref 3.5–5)
ALP SERPL-CCNC: 64 U/L (ref 46–116)
ALT SERPL W P-5'-P-CCNC: 49 U/L (ref 12–78)
ANION GAP SERPL CALCULATED.3IONS-SCNC: 8 MMOL/L (ref 4–13)
APTT HEX PL PPP: 0 SEC (ref 0–11)
APTT SCREEN TO CONFIRM RATIO: 1.02 RATIO (ref 0–1.4)
APTT-LA IMM 4:1 NP PPP: 76.8 SEC (ref 0–48.9)
AST SERPL W P-5'-P-CCNC: 28 U/L (ref 5–45)
BILIRUB SERPL-MCNC: 0.86 MG/DL (ref 0.2–1)
BUN SERPL-MCNC: 5 MG/DL (ref 5–25)
CALCIUM ALBUM COR SERPL-MCNC: 9.8 MG/DL (ref 8.3–10.1)
CALCIUM SERPL-MCNC: 8.4 MG/DL (ref 8.3–10.1)
CHLORIDE SERPL-SCNC: 98 MMOL/L (ref 100–108)
CO2 SERPL-SCNC: 29 MMOL/L (ref 21–32)
CONFIRM APTT/NORMAL: 45.7 SEC (ref 0–55)
CREAT SERPL-MCNC: 0.75 MG/DL (ref 0.6–1.3)
CRP SERPL QL: >300 MG/L
ERYTHROCYTE [DISTWIDTH] IN BLOOD BY AUTOMATED COUNT: 12.8 % (ref 11.6–15.1)
F5 GENE MUT ANL BLD/T: NORMAL
GFR SERPL CREATININE-BSD FRML MDRD: 148 ML/MIN/1.73SQ M
GLUCOSE SERPL-MCNC: 101 MG/DL (ref 65–140)
HCT VFR BLD AUTO: 35 % (ref 36.5–49.3)
HGB BLD-MCNC: 12.4 G/DL (ref 12–17)
LA PPP-IMP: ABNORMAL
MCH RBC QN AUTO: 29.9 PG (ref 26.8–34.3)
MCHC RBC AUTO-ENTMCNC: 35.4 G/DL (ref 31.4–37.4)
MCV RBC AUTO: 84 FL (ref 82–98)
PLATELET # BLD AUTO: 460 THOUSANDS/UL (ref 149–390)
PMV BLD AUTO: 9.6 FL (ref 8.9–12.7)
POTASSIUM SERPL-SCNC: 3.4 MMOL/L (ref 3.5–5.3)
PROCALCITONIN SERPL-MCNC: <0.05 NG/ML
PROT S ACT/NOR PPP: 16 % (ref 71–117)
PROT SERPL-MCNC: 7.5 G/DL (ref 6.4–8.2)
RBC # BLD AUTO: 4.15 MILLION/UL (ref 3.88–5.62)
SCREEN APTT: 86.7 SEC (ref 0–51.9)
SCREEN DRVVT: 41 SEC (ref 0–47)
SODIUM SERPL-SCNC: 135 MMOL/L (ref 136–145)
THROMBIN TIME: 27.9 SEC (ref 0–23)
WBC # BLD AUTO: 16.4 THOUSAND/UL (ref 4.31–10.16)

## 2021-09-09 PROCEDURE — 85027 COMPLETE CBC AUTOMATED: CPT | Performed by: PHYSICIAN ASSISTANT

## 2021-09-09 PROCEDURE — 80053 COMPREHEN METABOLIC PANEL: CPT | Performed by: PHYSICIAN ASSISTANT

## 2021-09-09 PROCEDURE — 93308 TTE F-UP OR LMTD: CPT | Performed by: INTERNAL MEDICINE

## 2021-09-09 PROCEDURE — 86140 C-REACTIVE PROTEIN: CPT | Performed by: PHYSICIAN ASSISTANT

## 2021-09-09 PROCEDURE — 93321 DOPPLER ECHO F-UP/LMTD STD: CPT | Performed by: INTERNAL MEDICINE

## 2021-09-09 PROCEDURE — 93325 DOPPLER ECHO COLOR FLOW MAPG: CPT | Performed by: INTERNAL MEDICINE

## 2021-09-09 PROCEDURE — 94762 N-INVAS EAR/PLS OXIMTRY CONT: CPT

## 2021-09-09 PROCEDURE — 99232 SBSQ HOSP IP/OBS MODERATE 35: CPT | Performed by: PHYSICIAN ASSISTANT

## 2021-09-09 PROCEDURE — 84145 PROCALCITONIN (PCT): CPT | Performed by: PHYSICIAN ASSISTANT

## 2021-09-09 PROCEDURE — 93308 TTE F-UP OR LMTD: CPT

## 2021-09-09 RX ORDER — METOPROLOL TARTRATE 5 MG/5ML
2.5 INJECTION INTRAVENOUS EVERY 6 HOURS PRN
Status: DISCONTINUED | OUTPATIENT
Start: 2021-09-09 | End: 2021-09-10 | Stop reason: HOSPADM

## 2021-09-09 RX ADMIN — APIXABAN 10 MG: 5 TABLET, FILM COATED ORAL at 18:29

## 2021-09-09 RX ADMIN — Medication 12.5 MG: at 11:22

## 2021-09-09 RX ADMIN — ACETAMINOPHEN 975 MG: 325 TABLET, FILM COATED ORAL at 21:11

## 2021-09-09 RX ADMIN — CEFTRIAXONE SODIUM 1000 MG: 10 INJECTION, POWDER, FOR SOLUTION INTRAVENOUS at 16:31

## 2021-09-09 RX ADMIN — APIXABAN 10 MG: 5 TABLET, FILM COATED ORAL at 10:58

## 2021-09-09 RX ADMIN — SODIUM CHLORIDE 75 ML/HR: 0.9 INJECTION, SOLUTION INTRAVENOUS at 01:52

## 2021-09-09 RX ADMIN — ACETAMINOPHEN 975 MG: 325 TABLET, FILM COATED ORAL at 05:44

## 2021-09-09 RX ADMIN — LIDOCAINE 5% 1 PATCH: 700 PATCH TOPICAL at 10:58

## 2021-09-09 RX ADMIN — ACETAMINOPHEN 975 MG: 325 TABLET, FILM COATED ORAL at 14:59

## 2021-09-09 NOTE — ASSESSMENT & PLAN NOTE
· Persists, suspect secondary to PNA and PE, will check limited echo today to ensure no underlying RV strain despite CT normal  · EKG with incomplete RBBB since admission  · Will start low dose BB and have him follow up with PCP

## 2021-09-09 NOTE — ASSESSMENT & PLAN NOTE
· Patient came in with pleuritic chest pain that started last night with nausea, secondary to pain  · Etiology likely secondary to [place]; no family history of blood clots  · D-dimer checked and noted to be elevated  · CTA PE study-multiple right lung segmental pulmonary emboli; no RV strain  Also noted bilateral pneumonia compatible with COVID-19 infection  · Transitioned to eliquis 10 mg bid x 7 days then 5 mg bid x 3 months  · Continue with pain control as needed (lidoderm, acetaminophen, tramadol)  · Avoid NSAIDs   · Thrombosis panel sent on admit    · Patient will need follow-up with primary care provider and possibly Hematology referral on discharge vs pulmonology referral for follow up

## 2021-09-09 NOTE — PLAN OF CARE
Problem: PAIN - ADULT  Goal: Verbalizes/displays adequate comfort level or baseline comfort level  Description: Interventions:  - Encourage patient to monitor pain and request assistance  - Assess pain using appropriate pain scale  - Administer analgesics based on type and severity of pain and evaluate response  - Implement non-pharmacological measures as appropriate and evaluate response  - Consider cultural and social influences on pain and pain management  - Notify physician/advanced practitioner if interventions unsuccessful or patient reports new pain  Outcome: Progressing     Problem: INFECTION - ADULT  Goal: Absence or prevention of progression during hospitalization  Description: INTERVENTIONS:  - Assess and monitor for signs and symptoms of infection  - Monitor lab/diagnostic results  - Monitor all insertion sites, i e  indwelling lines, tubes, and drains  - Monitor endotracheal if appropriate and nasal secretions for changes in amount and color  - Herminie appropriate cooling/warming therapies per order  - Administer medications as ordered  - Instruct and encourage patient and family to use good hand hygiene technique  - Identify and instruct in appropriate isolation precautions for identified infection/condition  Outcome: Progressing  Goal: Absence of fever/infection during neutropenic period  Description: INTERVENTIONS:  - Monitor WBC    Outcome: Progressing     Problem: SAFETY ADULT  Goal: Maintain or return to baseline ADL function  Description: INTERVENTIONS:  -  Assess patient's ability to carry out ADLs; assess patient's baseline for ADL function and identify physical deficits which impact ability to perform ADLs (bathing, care of mouth/teeth, toileting, grooming, dressing, etc )  - Assess/evaluate cause of self-care deficits   - Assess range of motion  - Assess patient's mobility; develop plan if impaired  - Assess patient's need for assistive devices and provide as appropriate  - Encourage maximum independence but intervene and supervise when necessary  - Involve family in performance of ADLs  - Assess for home care needs following discharge   - Consider OT consult to assist with ADL evaluation and planning for discharge  - Provide patient education as appropriate  Outcome: Progressing  Goal: Maintains/Returns to pre admission functional level  Description: INTERVENTIONS:  - Perform BMAT or MOVE assessment daily    - Set and communicate daily mobility goal to care team and patient/family/caregiver  - Collaborate with rehabilitation services on mobility goals if consulted  - Perform Range of Motion  times a day  - Reposition patient every  hours  - Dangle patient  times a day  - Stand patient  times a day  - Ambulate patient  times a day  - Out of bed to chair  times a day   - Out of bed for meals  times a day  - Out of bed for toileting  - Record patient progress and toleration of activity level   Outcome: Progressing     Problem: DISCHARGE PLANNING  Goal: Discharge to home or other facility with appropriate resources  Description: INTERVENTIONS:  - Identify barriers to discharge w/patient and caregiver  - Arrange for needed discharge resources and transportation as appropriate  - Identify discharge learning needs (meds, wound care, etc )  - Arrange for interpretive services to assist at discharge as needed  - Refer to Case Management Department for coordinating discharge planning if the patient needs post-hospital services based on physician/advanced practitioner order or complex needs related to functional status, cognitive ability, or social support system  Outcome: Progressing     Problem: Knowledge Deficit  Goal: Patient/family/caregiver demonstrates understanding of disease process, treatment plan, medications, and discharge instructions  Description: Complete learning assessment and assess knowledge base    Interventions:  - Provide teaching at level of understanding  - Provide teaching via preferred learning methods  Outcome: Progressing     Problem: HEMATOLOGIC - ADULT  Goal: Maintains hematologic stability  Description: INTERVENTIONS  - Assess for signs and symptoms of bleeding or hemorrhage  - Monitor labs  - Administer supportive blood products/factors as ordered and appropriate  Outcome: Progressing     Problem: MOBILITY - ADULT  Goal: Maintain or return to baseline ADL function  Description: INTERVENTIONS:  -  Assess patient's ability to carry out ADLs; assess patient's baseline for ADL function and identify physical deficits which impact ability to perform ADLs (bathing, care of mouth/teeth, toileting, grooming, dressing, etc )  - Assess/evaluate cause of self-care deficits   - Assess range of motion  - Assess patient's mobility; develop plan if impaired  - Assess patient's need for assistive devices and provide as appropriate  - Encourage maximum independence but intervene and supervise when necessary  - Involve family in performance of ADLs  - Assess for home care needs following discharge   - Consider OT consult to assist with ADL evaluation and planning for discharge  - Provide patient education as appropriate  Outcome: Progressing  Goal: Maintains/Returns to pre admission functional level  Description: INTERVENTIONS:  - Perform BMAT or MOVE assessment daily    - Set and communicate daily mobility goal to care team and patient/family/caregiver  - Collaborate with rehabilitation services on mobility goals if consulted  - Perform Range of Motion  times a day  - Reposition patient every  hours    - Dangle patient  times a day  - Stand patient  times a day  - Ambulate patien times a day  - Out of bed to chair  times a day   - Out of bed for meals  times a day  - Out of bed for toileting  - Record patient progress and toleration of activity level   Outcome: Progressing

## 2021-09-09 NOTE — DISCHARGE SUMMARY
[facility]  SLIM Discharge- Jitendra Gasmen 1995, 27 y o  male MRN: [id number]  Unit/Bed#: MS Shane-Thom Encounter: [id number]  Primary Care Provider: Treasure Magdaleno MD   Date and time admitted to hospital: 9/5/2021 12:45 PM    * Pulmonary embolism [facility])  Assessment & Plan  · Patient came in with pleuritic chest pain that started last night with nausea, secondary to pain  · Etiology likely secondary to COVID; no family history of blood clots  · D-dimer checked and noted to be elevated  · CTA PE study-multiple right lung segmental pulmonary emboli; no RV strain  Also noted bilateral pneumonia compatible with COVID-19 infection  · Transitioned to eliquis 10 mg bid x 7 days then 5 mg bid x 3 months  · Continue with pain control as needed (lidoderm, acetaminophen, tramadol)  · Avoid NSAIDs   · Thrombosis panel sent on admit    · Patient will need follow-up with primary care provider and possibly Hematology referral on discharge vs pulmonology referral for follow up     [address] [place] [address]  · Patient originally diagnosed on 8/26, imaging on presentation with infiltrates consistent with COVID-19 pneumonia  · MILD PATHWAY  · Currently, no symptoms of shortness of breath but has pleuritic pain as noted above  · No indication for remdesivir - outside window  · No indication for convalescent plasma - outside window  · No indication for acetmra given stability though his CRP is significantly elevated  · No indication for corticosteroids - O2 sats are stable   · Monitor O2 saturation - follow up on nocturnal pulse oximetry  · Anticoagulation with eliquis due to PE   · Encourage ambulation, incentive spirometry, self-prone position   · Supportive care   Results from last 7 days   Lab Units 09/09/21  0454 09/06/21  0512 09/05/21  1310   WBC Thousand/uL 16 40*  --  10 47*   D-DIMER QUANTITATIVE ug/ml FEU  --   --  3 81*   CRP mg/L >300 0*   < >  --    SARS-COV-2   --   --  Positive*    < > = values in this interval not displayed  Elevated liver enzymes-resolved as of 9/9/2021  Assessment & Plan  · Present on admission; likely in setting of COVID-19 infection  · No pain to palpation of right upper quadrant on exam  · AST/ALT; 69/185; Bilirubin 1 69 on admission  · Chronic hepatitis panel negative  · Normalized  Hyponatremia  Assessment & Plan  · Present on admission; likely in setting of COVID-19 infection  · Essentially resolved, patient was noted to have excess oral free water intake  Results from last 7 days   Lab Units 09/09/21  0454 09/08/21  0510 09/07/21  1458 09/06/21  0512 09/05/21  1310   SODIUM mmol/L 135* 132* 127* 133* 132*       Tachycardia  Assessment & Plan  · Persists, suspect secondary to PNA and PE, will check limited echo today to ensure no underlying RV strain despite CT normal  · Negative echo, sinus tachy  · Continue to monitor at home, no medication indicated        Medical Problems     Resolved Problems  Date Reviewed: 9/9/2021        Resolved    Elevated liver enzymes 9/9/2021     Resolved by  Fredy Le PA-C              Discharging Physician / Practitioner: Fredy Le PA-C  PCP: Jayne Messina MD  Admission Date:   Admission Orders (From admission, onward)     Ordered        09/06/21 1143  Inpatient Admission  Once         09/05/21 1605  Place in Observation  Once                   Discharge Date: 09/09/21    Consultations During Hospital Stay:  · None     Procedures Performed:   · Nocturnal pulse oximetry 9/8-9/9/2021: did show overnight episodes of hypoxia <88%  · Echo 9/9/2021: did show normal RV and atria with normal EF     Significant Findings / Test Results:   XR chest portable   Final Result by Yesi Read MD (09/08 1007)      No definite effusion  Persistent moderate bilateral groundglass opacity due to Covid-19  CTA ED chest PE Study   Final Result by Maciej Montana MD (09/05 1530)      Multiple right lung segmental pulmonary emboli  Measured RV/LV ratio is within normal limits at less than 0 9  Extensive bilateral pneumonia compatible with confirmed COVID 19 infection  XR chest 1 view portable   Final Result by Kristin Salmon MD (09/05 1522)      Bilateral pneumonia compatible with confirmed COVID 19 infection  Results from last 7 days   Lab Units 09/10/21  0559 09/09/21  0454 09/08/21  0510 09/07/21  1458 09/06/21  0512 09/05/21  1310   WBC Thousand/uL 12 73* 16 40* 15 52*   < >  --  10 47*   BUN mg/dL 7 5 7   < >  --  10   CREATININE mg/dL 0 67 0 75 0 80   < >  --  0 81   D-DIMER QUANTITATIVE ug/ml FEU  --   --   --   --   --  3 81*   CRP mg/L >300 0* >300 0*  --   --    < >  --    PROCALCITONIN ng/ml  --  <0 05 0 06  --   --   --     < > = values in this interval not displayed  Antithrombin III Activity Normal Final result   Cardiolipin antibody  Final result   Factor 5 leiden Negative (no mutation) Final result   Lupus anticoagulant AbnormalAbnormal  Final result   Protein C activity Normal Final result   Protein S activity AbnormalAbnormal  Final result   Protein S Antigen, Total & Free AbnormalAbnormal  Final result   Prothrombin gene mutation  In process   Beta-2 glycoprotein antibodies Negative Final result      Ref  Range 9/6/2021 05:11   PTT LUPUS ANTICOAGULANT Latest Ref Range: 0 0 - 51 9 sec 86 7 (H)   Hex Phosph Neut Test Latest Ref Range: 0 - 11 sec 0   DILUTE IMAN VIPER VENOM TIME Latest Ref Range: 0 0 - 47 0 sec 41 0   DPT CONFIRM RATIO Latest Ref Range: 0 00 - 1 40 Ratio 1 02     PROTEIN S ACTIVITY Latest Ref Range: 71 - 117 % 16 (L)   PROTEIN S ANTIGEN FREE Latest Ref Range: 61 - 136 % 34 (L)   PROTEIN S ANTIGEN TOTAL Latest Ref Range: 60 - 150 % 92     Protein C Activity Latest Ref Range: 60 - 150 % of Normal 87 0     ** No lupus anticoagulant was detected   Results suggest the presence of an   inhibitor   The presence of heparin, which is a non-specific inhibitor, may   cause this pattern of results  Since the PTT-LA was extended and the dRVVT   was within normal limits, a specific inhibitor to factor VIII, IX, XI, or   XII cannot be excluded  As antibody titers may fluctuate with time, repeat   testing may be indicated and ideally should be performed in the absence of   anticoagulant therapy  Incidental Findings:   · As above      Test Results Pending at Discharge (will require follow up): · Final thrombosis panel results      Outpatient Tests Requested:  · PCP follow up within 1 week   · Pulmonology referral for evaluation within 1 month    Complications:  Tachycardia     Reason for Admission: COVID-19 pneumonia and pulmonary emboli     Hospital Course:   Ham Barragan is a 27 y o  male patient who originally presented to the hospital on 9/5/2021 due to chest pain  Patient had been diagnosed with COVID-19 infection on 8/26/2021 and self-isolated at home  He developed sharp, right-sided chest pain prompting ER evaluation and was found to have extensive bilateral viral pneumonia along with segmental right-sided pulmonary emboli  Patient was started on IV heparin drip and admitted for further management  Luckily, no RV strain noted  He has done well, pain is nearly resolved except with cough  He has tachycardia which is stable  He has intermittent fevers which is likely related to COVID  He is doing well otherwise, transitioned to [address] and tolerating well  At this time, he is stable for discharge to home to complete his isolation period  He will need follow up with PCP and pulmonology  Please see above list of diagnoses and related plan for additional information  Condition at Discharge: stable    Discharge Day Visit / Exam:   /92 (BP Location: Right arm)   Pulse 96   Temp 99 4 °F (37 4 °C) (Oral)   Resp 18   Ht 6' 1" (1 854 m)   Wt 104 kg (229 lb 4 5 oz)   SpO2 97%   BMI 30 25 kg/m²   Physical Exam  Vitals and nursing note reviewed     Constitutional: General: He is not in acute distress  Appearance: He is obese  He is not ill-appearing, toxic-appearing or diaphoretic  Cardiovascular:      Rate and Rhythm: Regular rhythm  Tachycardia present  Heart sounds: No murmur heard  Comments: Slightly tachy 100-110  Pulmonary:      Effort: Pulmonary effort is normal  No respiratory distress  Breath sounds: Normal breath sounds  No wheezing or rales  Abdominal:      General: Bowel sounds are normal       Palpations: Abdomen is soft  Musculoskeletal:      Right lower leg: No edema  Left lower leg: No edema  Neurological:      Mental Status: He is alert and oriented to person, place, and time  Psychiatric:         Mood and Affect: Mood normal          Behavior: Behavior normal          Discussion with Family: Attempted to update  (mother) via phone  Left voicemail  Discharge instructions/Information to patient and family:   See after visit summary for information provided to patient and family  Provisions for Follow-Up Care:  See after visit summary for information related to follow-up care and any pertinent home health orders  Disposition:   Home    Planned Readmission: none      Discharge Statement:  I spent >45 minutes discharging the patient  This time was spent on the day of discharge  I had direct contact with the patient on the day of discharge  Greater than 50% of the total time was spent examining patient, answering all patient questions, arranging and discussing plan of care with patient as well as directly providing post-discharge instructions  Additional time then spent on discharge activities  Discharge Medications:  See after visit summary for reconciled discharge medications provided to patient and/or family        **Please Note: This note may have been constructed using a voice recognition system**

## 2021-09-09 NOTE — ASSESSMENT & PLAN NOTE
· Patient originally diagnosed on 8/26, imaging on presentation with infiltrates consistent with COVID-19 pneumonia  · MILD PATHWAY  · Currently, no symptoms of shortness of breath but has pleuritic pain as noted above  · No indication for remdesivir - outside window  · No indication for convalescent plasma - outside window  · No indication for acetmra given stability though his CRP is significantly elevated  · No indication for corticosteroids - O2 sats are stable   · Monitor O2 saturation - follow up on nocturnal pulse oximetry  · Anticoagulation with eliquis due to PE   · Encourage ambulation, incentive spirometry, self-prone position   · Supportive care   Results from last 7 days   Lab Units 09/09/21  0454 09/06/21  0512 09/05/21  1310   WBC Thousand/uL 16 40*  --  10 47*   D-DIMER QUANTITATIVE ug/ml FEU  --   --  3 81*   CRP mg/L >300 0*   < >  --    SARS-COV-2   --   --  Positive*    < > = values in this interval not displayed

## 2021-09-09 NOTE — PROGRESS NOTES
[facility] Progress Note - Janice Davidson 1995, 27 y o  male MRN: [id number]  Unit/Bed#: -Thom Encounter: [id number]  Primary Care Provider: Romeo Garrido MD   Date and time admitted to hospital: 9/5/2021 12:45 PM    * Pulmonary embolism [facility])  Assessment & Plan  · Patient came in with pleuritic chest pain that started last night with nausea, secondary to pain  · Etiology likely secondary to COVID; no family history of blood clots  · D-dimer checked and noted to be elevated  · CTA PE study-multiple right lung segmental pulmonary emboli; no RV strain  Also noted bilateral pneumonia compatible with COVID-19 infection  · Transitioned to eliquis 10 mg bid x 7 days then 5 mg bid x 3 months  · Continue with pain control as needed (lidoderm, acetaminophen, tramadol)  · Avoid NSAIDs   · Thrombosis panel sent on admit    · Patient will need follow-up with primary care provider and possibly Hematology referral on discharge vs pulmonology referral for follow up     [address] [place] [address]  · Patient originally diagnosed on 8/26, imaging on presentation with infiltrates consistent with COVID-19 pneumonia  · MILD PATHWAY  · Currently, no symptoms of shortness of breath but has pleuritic pain as noted above  · No indication for remdesivir - outside window  · No indication for convalescent plasma - outside window  · No indication for acetmra given stability though his CRP is significantly elevated  · No indication for corticosteroids - O2 sats are stable   · Monitor O2 saturation - follow up on nocturnal pulse oximetry  · Anticoagulation with eliquis due to PE   · Encourage ambulation, incentive spirometry, self-prone position   · Supportive care   Results from last 7 days   Lab Units 09/09/21  0454 09/06/21  0512 09/05/21  1310   WBC Thousand/uL 16 40*  --  10 47*   D-DIMER QUANTITATIVE ug/ml FEU  --   --  3 81*   CRP mg/L >300 0*   < >  --    SARS-COV-2   --   --  Positive*    < > = values in this interval not displayed  Elevated liver enzymes-resolved as of 9/9/2021  Assessment & Plan  · Present on admission; likely in setting of COVID-19 infection  · No pain to palpation of right upper quadrant on exam  · AST/ALT; 69/185; Bilirubin 1 69 on admission  · Chronic hepatitis panel negative  · Normalized  Hyponatremia  Assessment & Plan  · Present on admission; likely in setting of COVID-19 infection  · Essentially resolved, patient was noted to have excess oral free water intake  Results from last 7 days   Lab Units 09/09/21  0454 09/08/21  0510 09/07/21  1458 09/06/21  0512 09/05/21  1310   SODIUM mmol/L 135* 132* 127* 133* 132*       Tachycardia  Assessment & Plan  · Persists, suspect secondary to PNA and PE, will check limited echo today to ensure no underlying RV strain despite CT normal  · EKG with incomplete RBBB since admission  · Will start low dose BB and have him follow up with PCP        VTE Pharmacologic Prophylaxis: VTE Score: 4 Moderate Risk (Score 3-4) - Pharmacological DVT Prophylaxis Ordered: apixaban (Eliquis)  Patient Centered Rounds: I evaluated the patient without nursing staff present due to [address] with Specialists or Other Care Team Provider: CM     Education and Discussions with Family / Patient: Updated  (mother) via phone  Time Spent for Care: 20 minutes  More than 50% of total time spent on counseling and coordination of care as described above  Current Length of Stay: 3 day(s)  Current Patient Status: Inpatient   Certification Statement: The patient will continue to require additional inpatient hospital stay due to echo today, ambulate to monitor HR  Discharge Plan: Anticipate discharge later today or tomorrow to home  Code Status: Level 1 - Full Code    Subjective:   Patient seen this am doing well  No chest pain or shortness of breath  He really wants to go home    He is feeling so much better, his right lower chest pain has completely resolved  He is urinating well and has no abdominal pain  He is asymptomatic of the tachycardia, but knows that his heart rate does go fast when he gets up to move around  Objective:     Vitals:   Temp (24hrs), Av °F (37 2 °C), Min:98 2 °F (36 8 °C), Max:100 3 °F (37 9 °C)    Temp:  [98 2 °F (36 8 °C)-100 3 °F (37 9 °C)] 98 9 °F (37 2 °C)  HR:  [104-150] 110  Resp:  [25-30] 25  BP: (125-141)/(66-73) 125/66  SpO2:  [87 %-96 %] 93 %  Body mass index is 30 25 kg/m²  Input and Output Summary (last 24 hours): Intake/Output Summary (Last 24 hours) at 2021 0754  Last data filed at 2021 0153  Gross per 24 hour   Intake 1720 ml   Output 2200 ml   Net -480 ml       Physical Exam:   Physical Exam  Constitutional:       General: He is not in acute distress  Appearance: He is obese  He is not ill-appearing, toxic-appearing or diaphoretic  Cardiovascular:      Rate and Rhythm: Regular rhythm  Tachycardia present  Heart sounds: Normal heart sounds  No murmur heard  Pulmonary:      Effort: Pulmonary effort is normal  No respiratory distress  Breath sounds: Examination of the right-lower field reveals decreased breath sounds  Examination of the left-lower field reveals decreased breath sounds  Decreased breath sounds (improved) present  No wheezing or rales  Abdominal:      General: Bowel sounds are normal  There is no distension  Palpations: Abdomen is soft  Musculoskeletal:      Right lower leg: No edema  Left lower leg: No edema  Neurological:      Mental Status: He is alert and oriented to person, place, and time     Psychiatric:         Mood and Affect: Mood normal            Additional Data:     Labs:  Results from last 7 days   Lab Units 21  0454 21  0510   WBC Thousand/uL 16 40* 15 52*   HEMOGLOBIN g/dL 12 4 14 8   HEMATOCRIT % 35 0* 41 7   PLATELETS Thousands/uL 460* 462*   NEUTROS PCT %  --  83*   LYMPHS PCT %  --  8*   MONOS PCT % --  8   EOS PCT %  --  0     Results from last 7 days   Lab Units 09/09/21  0454   SODIUM mmol/L 135*   POTASSIUM mmol/L 3 4*   CHLORIDE mmol/L 98*   CO2 mmol/L 29   BUN mg/dL 5   CREATININE mg/dL 0 75   ANION GAP mmol/L 8   CALCIUM mg/dL 8 4   ALBUMIN g/dL 2 2*   TOTAL BILIRUBIN mg/dL 0 86   ALK PHOS U/L 64   ALT U/L 49   AST U/L 28   GLUCOSE RANDOM mg/dL 101     Results from last 7 days   Lab Units 09/05/21  1310   INR  1 12     Results from last 7 days   Lab Units 09/07/21  1132   POC GLUCOSE mg/dl 99         Results from last 7 days   Lab Units 09/08/21  0510   PROCALCITONIN ng/ml 0 06       Lines/Drains:  Invasive Devices     Peripheral Intravenous Line            Peripheral IV 09/05/21 Left Antecubital 3 days                      Imaging: No pertinent imaging reviewed  Recent Cultures (last 7 days):         Last 24 Hours Medication List:   Current Facility-Administered Medications   Medication Dose Route Frequency Provider Last Rate    acetaminophen  650 mg Oral Q6H PRN Khang Azul DO      acetaminophen  975 mg Oral [facility] Anaya Bradley PA-C      apixaban  10 mg Oral BID Lakisha Hendrix PA-C      Followed by   Iwona Walker ON 9/14/2021] apixaban  5 mg Oral BID Lakisha Hendrix PA-C      cefTRIAXone  1,000 mg Intravenous Q24H Lakisha Hendrix PA-C 1,000 mg (09/08/21 1700)    ibuprofen  400 mg Oral Q6H PRN Khang Azul DO      lidocaine  1 patch Topical Daily Lakisha Hendrix PA-C      ondansetron  4 mg Intravenous Q8H PRN Lakisha Hendrix PA-C      traMADol  50 mg Oral Q6H PRN Ren Zhao PA-C          Today, Patient Was Seen By: Anaya Bradley PA-C    **Please Note: This note may have been constructed using a voice recognition system  **

## 2021-09-09 NOTE — ASSESSMENT & PLAN NOTE
· Present on admission; likely in setting of COVID-19 infection  · No pain to palpation of right upper quadrant on exam  · AST/ALT; 69/185; Bilirubin 1 69 on admission  · Chronic hepatitis panel negative  · Normalized

## 2021-09-09 NOTE — ASSESSMENT & PLAN NOTE
· Present on admission; likely in setting of COVID-19 infection    · Essentially resolved, patient was noted to have excess oral free water intake  Results from last 7 days   Lab Units 09/09/21  0454 09/08/21  0510 09/07/21  1458 09/06/21  0512 09/05/21  1310   SODIUM mmol/L 135* 132* 127* 133* 132*

## 2021-09-10 LAB
ANION GAP SERPL CALCULATED.3IONS-SCNC: 11 MMOL/L (ref 4–13)
BUN SERPL-MCNC: 7 MG/DL (ref 5–25)
CALCIUM SERPL-MCNC: 8.6 MG/DL (ref 8.3–10.1)
CHLORIDE SERPL-SCNC: 98 MMOL/L (ref 100–108)
CO2 SERPL-SCNC: 26 MMOL/L (ref 21–32)
CREAT SERPL-MCNC: 0.67 MG/DL (ref 0.6–1.3)
CRP SERPL QL: >300 MG/L
DME PARACHUTE DELIVERY DATE ACTUAL: NORMAL
DME PARACHUTE DELIVERY DATE EXPECTED: NORMAL
DME PARACHUTE DELIVERY DATE REQUESTED: NORMAL
DME PARACHUTE ITEM DESCRIPTION: NORMAL
DME PARACHUTE ORDER STATUS: NORMAL
DME PARACHUTE SUPPLIER NAME: NORMAL
DME PARACHUTE SUPPLIER PHONE: NORMAL
ERYTHROCYTE [DISTWIDTH] IN BLOOD BY AUTOMATED COUNT: 12.8 % (ref 11.6–15.1)
GFR SERPL CREATININE-BSD FRML MDRD: 155 ML/MIN/1.73SQ M
GLUCOSE SERPL-MCNC: 103 MG/DL (ref 65–140)
HCT VFR BLD AUTO: 41 % (ref 36.5–49.3)
HGB BLD-MCNC: 14.3 G/DL (ref 12–17)
MCH RBC QN AUTO: 29.3 PG (ref 26.8–34.3)
MCHC RBC AUTO-ENTMCNC: 34.9 G/DL (ref 31.4–37.4)
MCV RBC AUTO: 84 FL (ref 82–98)
PLATELET # BLD AUTO: 458 THOUSANDS/UL (ref 149–390)
PMV BLD AUTO: 9.7 FL (ref 8.9–12.7)
POTASSIUM SERPL-SCNC: 3.4 MMOL/L (ref 3.5–5.3)
RBC # BLD AUTO: 4.88 MILLION/UL (ref 3.88–5.62)
SODIUM SERPL-SCNC: 135 MMOL/L (ref 136–145)
WBC # BLD AUTO: 12.73 THOUSAND/UL (ref 4.31–10.16)

## 2021-09-10 PROCEDURE — 80048 BASIC METABOLIC PNL TOTAL CA: CPT | Performed by: PHYSICIAN ASSISTANT

## 2021-09-10 PROCEDURE — 85027 COMPLETE CBC AUTOMATED: CPT | Performed by: PHYSICIAN ASSISTANT

## 2021-09-10 PROCEDURE — 86140 C-REACTIVE PROTEIN: CPT | Performed by: PHYSICIAN ASSISTANT

## 2021-09-10 PROCEDURE — 99239 HOSP IP/OBS DSCHRG MGMT >30: CPT | Performed by: PHYSICIAN ASSISTANT

## 2021-09-10 RX ORDER — BENZONATATE 100 MG/1
100 CAPSULE ORAL 3 TIMES DAILY PRN
Qty: 20 CAPSULE | Refills: 0 | Status: SHIPPED | OUTPATIENT
Start: 2021-09-10

## 2021-09-10 RX ORDER — GUAIFENESIN 600 MG
600 TABLET, EXTENDED RELEASE 12 HR ORAL EVERY 12 HOURS SCHEDULED
Status: DISCONTINUED | OUTPATIENT
Start: 2021-09-10 | End: 2021-09-10 | Stop reason: HOSPADM

## 2021-09-10 RX ORDER — GUAIFENESIN 600 MG
600 TABLET, EXTENDED RELEASE 12 HR ORAL EVERY 12 HOURS SCHEDULED
Refills: 0
Start: 2021-09-10

## 2021-09-10 RX ORDER — CEFDINIR 300 MG/1
300 CAPSULE ORAL EVERY 12 HOURS SCHEDULED
Qty: 4 CAPSULE | Refills: 0 | Status: SHIPPED | OUTPATIENT
Start: 2021-09-10 | End: 2021-09-12

## 2021-09-10 RX ORDER — ALBUTEROL SULFATE 90 UG/1
2 AEROSOL, METERED RESPIRATORY (INHALATION) EVERY 6 HOURS PRN
Qty: 18 G | Refills: 0 | Status: SHIPPED | OUTPATIENT
Start: 2021-09-10

## 2021-09-10 RX ADMIN — GUAIFENESIN 600 MG: 600 TABLET ORAL at 09:51

## 2021-09-10 RX ADMIN — ACETAMINOPHEN 975 MG: 325 TABLET, FILM COATED ORAL at 05:52

## 2021-09-10 RX ADMIN — LIDOCAINE 5% 1 PATCH: 700 PATCH TOPICAL at 09:53

## 2021-09-10 RX ADMIN — APIXABAN 10 MG: 5 TABLET, FILM COATED ORAL at 09:51

## 2021-09-10 NOTE — UTILIZATION REVIEW
Continued Stay Review    Date: 9/7              Current Patient Class: INpatient  Current Level of Care: MEd/surg    HPI:26 y o  male initially admitted on 9/6     Assessment/Plan:   Etiology of PE is likely COVID  No family h/o blood clots  Continue with Heparin drip  Liver enzymes trending down  Continues with right sided check pain worse with movement  Medicated as needed  Vomited after pain medications  Requiring [facility]     Vital Signs:  Observations  --  --  --  --  --   Row Name  09/08/21   0300  09/07/21 2300 09/07/21 2132 09/07/21 2123 09/07/21   2030   Vital Signs   Temp  --  --  100 1 °F (37 8  °C)  --  --   Temp src  --  --  Oral  --  --   Pulse  --  --  110Abnormal   --  --   Heart Rate Source  --  --  --  --  --   Resp  --  --  21  --  --   BP  --  --  129/76  --  --   MAP (mmHg)  --  --  95  --  --   Girls Systolic BP Percentile  --  --  --  --  --   Girls Diastolic BP Percentile  --  --  --  --  --   Boys Systolic BP Percentile  --  --  --  --  --   Boys Diastolic BP Percentile  --  --  --  --  --   BP Location  --  --  Right arm  --  --   BP Method  --  --  Automatic  --  --   Patient Position - Orthostatic VS  --  --  Lying  --  --   Sys BP %ile  --  --  --         Oxygen Therapy   SpO2  94 %  93 %  94 %  --  --   SpO2 Activity  At Rest  At Rest  At Rest  --  --   O2 Device  Nasal cannula  Nasal cannula  Nasal cannula  --  --   Safety Instructions  --  --  --  --  --   Nasal Cannula O2 Flow Rate (L/min)  2 L/min  2 L/min  2 L/min  --  --   Calculated FIO2 (%) - Nasal Cannula  28  28  28  --  --   Pulse Oximetry Type  Continuous  Continuous  --         Pertinent Labs/Diagnostic Results:   Results from last 7 days   Lab Units 09/05/21  1310   SARS-COV-2  Positive*     Results from last 7 days   Lab Units 09/07/21  1458 09/06/21  0512 09/05/21  1310   WBC Thousand/uL 16 34* 12 79* 10 47*   HEMOGLOBIN g/dL 15 0 15 2 16 6   HEMATOCRIT % 41 6 42 7 45 9   PLATELETS Thousands/uL 448* 373 363 NEUTROS ABS Thousands/µL  --   --  7 82*         Results from last 7 days   Lab Units 09/07/21  1458 09/06/21  0512   SODIUM mmol/L 127* 133*   POTASSIUM mmol/L 3 6 3 5   CHLORIDE mmol/L 93* 97*   CO2 mmol/L 24 23   ANION GAP mmol/L 10 13   BUN mg/dL 8 10   CREATININE mg/dL 0 85 0 77   EGFR ml/min/1 73sq m 141 147   CALCIUM mg/dL 8 7 8 9     Results from last 7 days   Lab Units 09/07/21  1458 09/06/21  0512 09/05/21  1310   AST U/L 26 42 69*   ALT U/L 80* 136* 185*   ALK PHOS U/L 66 64 68   TOTAL PROTEIN g/dL 7 8 7 7 8 2   ALBUMIN g/dL 2 5* 3 0* 3 3*   TOTAL BILIRUBIN mg/dL 1 19* 1 29* 1 65*     Results from last 7 days   Lab Units 09/07/21  1132   POC GLUCOSE mg/dl 99     Results from last 7 days   Lab Units 09/10/21  0559 09/09/21  0454 09/08/21  0510 09/07/21  1458 09/06/21  0512 09/05/21  1310   GLUCOSE RANDOM mg/dL 103 101 95 113 96 116           Results from last 7 days   Lab Units 09/05/21  1310   TROPONIN I ng/mL <0 02     Results from last 7 days   Lab Units 09/05/21  1310   D-DIMER QUANTITATIVE ug/ml FEU 3 81*     Results from last 7 days   Lab Units 09/07/21  0511 09/06/21  1503 09/05/21  1310   PROTIME seconds  --   --  13 9   INR   --   --  1 12   PTT seconds 69* 78* 29     Results from last 7 days   Lab Units 09/05/21  1310   TSH 3RD GENERATON uIU/mL 0 264*     Results from last 7 days       Results from last 7 days   Lab Units 09/07/21  1458   NT-PRO BNP pg/mL 73         Results from last 7 days   Lab Units 09/06/21  0817   HEP B S AG  Non-reactive   HEP C AB  Non-reactive   HEP B C IGM  Non-reactive   HEP B C TOTAL AB  Non-reactive         Results from last 7 days   Lab Units 09/07/21  1458   CRP mg/L 455 2*         Results from last 7 days   Lab Units 09/07/21  2154   SODIUM UR  10           Medications:   Scheduled Medications:  acetaminophen, 975 mg, Oral, Q8H KRISTI  apixaban, 10 mg, Oral, BID   Followed by  Yasemin Martinez ON 9/14/2021] apixaban, 5 mg, Oral, BID  cefTRIAXone, 1,000 mg, Intravenous, Q24H  guaiFENesin, 600 mg, Oral, Q12H KRISTI  lidocaine, 1 patch, Topical, Daily      Continuous IV Infusions:     PRN Meds:  acetaminophen, 650 mg, Oral, Q6H PRN  ibuprofen, 400 mg, Oral, Q6H PRN  metoprolol, 2 5 mg, Intravenous, Q6H PRN  ondansetron, 4 mg, Intravenous, Q8H PRN  traMADol, 50 mg, Oral, Q6H PRN        Discharge Plan: D  Network Utilization Review Department  ATTENTION: Please call with any questions or concerns to [phone] and carefully listen to the prompts so that you are directed to the right person  All voicemails are confidential   Claritza Payne all requests for admission clinical reviews, approved or denied determinations and any other requests to dedicated fax number below belonging to the campus where the patient is receiving treatment   List of dedicated fax numbers for the Facilities:  [address] DENIALS (Administrative/Medical Necessity) [phone]   [address] (Maternity/NICU/Pediatrics) [phone]   [address] [place] [address] [address] [place] [address] [address] [facility] [place] [address] [phone]

## 2021-09-10 NOTE — DISCHARGE INSTRUCTIONS
How to Recover from COVID-19 at [address] [facility]:   What do I need to know about recovery at home from COVID-19? COVID-19 can cause a range of symptoms, from mild to severe  If you do not need to be treated in a hospital, you will be given instructions to use at home  You will need to watch for worsening symptoms and seek immediate care if needed  You will also need to stay physically apart from others so you do not spread the virus to anyone  Information about COVID-19 is still being learned  It is not known if a person can be infected with the virus again after recovering from COVID-19  It is also not known if or for how long the virus can continue to be passed to others  What can I do to manage my symptoms? Mild symptoms may get better on their own  The following may be used to manage your symptoms:  · Decongestants  help reduce nasal congestion and help you breathe more easily  If you take decongestant pills, they may make you feel restless or cause problems with your sleep  Do not use decongestant sprays for more than a few days  · Cough suppressants  help reduce coughing  Ask your healthcare provider which type of cough medicine is best for you  · To soothe a sore throat,  gargle with warm salt water, or use throat lozenges or a throat spray  Your healthcare provider may recommend a cough medicine  Drink more liquids to thin and loosen mucus and to prevent dehydration  Use decongestants or saline drops as directed for nasal congestion  · NSAIDs , such as ibuprofen, help decrease swelling, pain, and fever  NSAIDs can cause stomach bleeding or kidney problems in certain people  If you take blood thinner medicine, always ask your healthcare provider if NSAIDs are safe for you  Always read the medicine label and follow directions  · Acetaminophen  decreases pain and fever  It is available without a doctor's order  Ask how much to take and how often to take it  Follow directions   Read the labels of all other medicines you are using to see if they also contain acetaminophen, or ask your doctor or pharmacist  Acetaminophen can cause liver damage if not taken correctly  Do not use more than 4 grams (4,000 milligrams) total of acetaminophen in one day  How can I keep others safe while I am recovering at home? Healthcare providers will give you specific instructions to follow  The following are general guidelines to remind you how to keep others safe until you are well:     · Wash your hands often  Use soap and water as much as possible  You can use hand  that contains alcohol if soap and water are not available  Do not share towels with anyone  If you use paper towels, throw them away in a lined trash can kept in your room or area  Use a covered trash can, if possible  · Cover sneezes and coughs  Turn your face away and cover your mouth and nose with a tissue  Throw the tissue away  Use the bend of your arm if a tissue is not available  Then wash your hands well with soap and water or use hand   · Wear a face covering (mask) around anyone who does not live in your home  A covering will help prevent you from passing the virus to others  It is not known for sure if or for how long the virus can be passed after recovery  Do not  wear a plastic face shield instead of a covering  Use a cloth covering with at least 2 layers  You can also create layers by putting a cloth covering over a disposable non-medical mask  Cover your mouth and your nose  The covering should fit snugly against the bridge of your nose  Securely fasten it under your chin and on the sides of your face  A face covering is not a substitute for other safety measures  Continue social distancing and washing your hands often  · Do not go out of your home unless it is necessary  If possible, ask someone who is not infected to go out for groceries, medicines, and household items   Ask your healthcare provider for other ways to have appointments  Some providers offer phone, video, or other types of appointments  If you need to be seen in person, call ahead to make sure the office will be ready for you  · Do not let anyone into your home, room, or area unless it is necessary  If possible, stay in a separate area or room of your home if you live with others  No one should go into the area or room except to give you care  Only allow medical professionals or other necessary helpers in  Wear a face covering  Remind them to wear face coverings and to wash their hands  If possible, ask someone who is well to care for your baby  You can put breast milk in bottles for the person to use, if needed  Wear a clean face covering if you need to breastfeed or express or pump breast milk  Family members and friends should not visit you  You can visit with others by phone, video chat, e-mail, or similar systems  It is important to stay connected with others in your life while you recover  · Talk to your healthcare provider about your baby  Tell him or her if you have any questions or concerns about caring for or bonding with your baby  He or she will tell you when to bring your baby in for check-ups and vaccines  He or she will also tell you what to do if you think your baby was infected with the coronavirus  · Do not handle live animals unless it is necessary  Until more is known, it is best not to touch, play with, or handle live animals  Some animals, including pets, have been infected with the new coronavirus  Do not handle or care for animals until you are well  Care includes feeding, petting, and cuddling your pet  Do not let your pet lick you or share your food  Ask someone who is not infected to take care of your pet, if possible  If you must care for a pet, wear a face covering  Wash your hands before and after you give care   Talk to your healthcare provider about how to keep a service animal safe, if needed  · Follow directions from your healthcare provider for being around others after you recover  It is not known for sure if or for how long a recovered person can pass the virus to others  Your provider may give you instructions, such as continuing social distancing or wearing a face covering around others  The following are general guidelines for when you can be around others:    ? If you never developed any symptoms,  wait at least 10 days after your positive test  Your provider may want you to have 2 negative tests in a row at least 24 hours apart  This depends on how available testing is in your area  ? If you did have symptoms,  wait at least 10 days after the symptoms first appeared  Then you will need to have no fever for 24 hours without fever medicine  Most of your symptoms will also need to be gone  A loss of taste or smell may continue for several months  It is considered okay to be around others if this is your only symptom  ? If you were hospitalized for COVID-19 and needed oxygen,  your provider will tell you how long to wait  You may need to wait until 20 days after symptoms appeared  It may be less if you have 2 negative tests in a row at least 24 hours apart  This will depend on how available testing is in your area  Where can I find more information? · Centers for Disease Control and Prevention  [address] , [address]  Phone: [phone]  Web Address: [employer] com     What should I do if I think someone in my home may be infected? Do the following to protect others:  · If emergency care is needed,  tell the  about the possible infection, or call ahead and tell the emergency department  · Call a healthcare provider  for instructions if symptoms are mild  Anyone who may be infected should not  arrive without calling first  The provider will need to protect staff members and other patients      · The person who may be infected needs to wear a face covering  while getting medical care  This will help lower the risk of infecting others  Coverings are not used for anyone who is younger than 2 years, has breathing problems, or cannot remove it  The provider can give you instructions for anyone who cannot wear a covering  Call your local emergency number (911 in the [facility]) or an emergency department if:   · You have trouble breathing or shortness of breath at rest     · You have chest pain or pressure that lasts longer than 5 minutes  · You become confused or hard to wake  · Your lips or face are blue  · You have a fever of 104°F (40°C) or higher  When should I call my doctor? · You have new, returning, or worsening symptoms  · Someone in your home does not  have symptoms of COVID-19 but had close physical contact within 14 days with you  · You have questions or concerns about your condition or care  CARE AGREEMENT:   You have the right to help plan your care  Learn about your health condition and how it may be treated  Discuss treatment options with your healthcare providers to decide what care you want to receive  You always have the right to refuse treatment  The above information is an  only  It is not intended as medical advice for individual conditions or treatments  Talk to your doctor, nurse or pharmacist before following any medical regimen to see if it is safe and effective for you  © Copyright [employer] 2021 Information is for End User's use only and may not be sold, redistributed or otherwise used for commercial purposes  All illustrations and images included in CareNotes® are the copyrighted property of [employer]  or [address]        Pulmonary Embolism   WHAT YOU NEED TO KNOW:   A pulmonary embolism (PE) is the sudden blockage of a blood vessel in the lungs by an embolus  A PE can become life-threatening  Go to follow-up appointments and take blood thinners as directed   These are especially important if you were discharged home from the emergency department  DISCHARGE INSTRUCTIONS:   Call your local emergency number (911 in the [address]) if:   · You feel lightheaded, short of breath, and have chest pain  · You cough up blood  Call your doctor if:   · Your arm or leg feels warm, tender, and painful  It may look swollen and red  · You have questions or concerns about your condition or care  Medicines:   · Blood thinners  help prevent blood clots  Clots can cause strokes, heart attacks, and death  The following are general safety guidelines to follow while you are taking a blood thinner:    ? Watch for bleeding and bruising while you take blood thinners  Watch for bleeding from your gums or nose  Watch for blood in your urine and bowel movements  Use a soft washcloth on your skin, and a soft toothbrush to brush your teeth  This can keep your skin and gums from bleeding  If you shave, use an electric shaver  Do not play contact sports  ? Tell your dentist and other healthcare providers that you take a blood thinner  Wear a bracelet or necklace that says you take this medicine  ? Do not start or stop any other medicines unless your healthcare provider tells you to  Many medicines cannot be used with blood thinners  ? Take your blood thinner exactly as prescribed by your healthcare provider  Do not skip does or take less than prescribed  Tell your provider right away if you forget to take your blood thinner, or if you take too much  ? Warfarin  is a blood thinner that you may need to take  The following are things you should be aware of if you take warfarin:     § Foods and medicines can affect the amount of warfarin in your blood  Do not make major changes to your diet while you take warfarin  Warfarin works best when you eat about the same amount of vitamin K every day  Vitamin K is found in green leafy vegetables and certain other foods   Ask for more information about what to eat when you are taking warfarin  § You will need to see your healthcare provider for follow-up visits when you are on warfarin  You will need regular blood tests  These tests are used to decide how much medicine you need  · Take your medicine as directed  Contact your healthcare provider if you think your medicine is not helping or if you have side effects  Tell him or her if you are allergic to any medicine  Keep a list of the medicines, vitamins, and herbs you take  Include the amounts, and when and why you take them  Bring the list or the pill bottles to follow-up visits  Carry your medicine list with you in case of an emergency  Prevent another PE:   · Change your body position or move around often  Move and stretch in your seat several times each hour if you travel by car or work at a desk  In an airplane, get up and walk every hour  · Exercise regularly to help increase your blood flow  Walking is a good low-impact exercise  Talk to your healthcare provider about the best exercise plan for you  · Maintain a healthy weight  Ask your healthcare provider how much you should weigh  Ask him or her to help you create a weight loss plan if you are overweight  · Do not smoke  Nicotine and other chemicals in cigarettes and cigars can damage blood vessels and increase your risk for another PE  Ask your healthcare provider for information if you currently smoke and need help to quit  E-cigarettes or smokeless tobacco still contain nicotine  Talk to your healthcare provider before you use these products  · Ask about birth control if you are a woman who takes the pill  A birth control pill increases the risk for blood clots in certain women  The risk is higher if you are also older than 35, smoke cigarettes, or have a blood clotting disorder  Talk to your healthcare provider about other ways to prevent pregnancy, such as a cervical cap or intrauterine device (IUD)      Follow up with your doctor or specialist as directed: You may need to come in regularly for scans to check for blood clots  Your blood may checked to see how long it takes to clot  Your doctor or specialist will tell you if you need to have this test and how often to have it  Write down your questions so you remember to ask them during your visits  © [employer] 2021 Information is for End User's use only and may not be sold, redistributed or otherwise used for commercial purposes  All illustrations and images included in CareNotes® are the copyrighted property of A [employer] A [employer] , Inc  or [facility] [address]   The above information is an  only  It is not intended as medical advice for individual conditions or treatments  Talk to your doctor, nurse or pharmacist before following any medical regimen to see if it is safe and effective for you  Apixaban (Eliquis) - (By mouth)   Why this medicine is used:   Treats and prevents blood clots  Contact a nurse or doctor right away if you have:  · Sudden or severe headache  · Back pain, numbness, tingling, weakness in your legs or feet  · Loss of bladder or bowel control  · Bloody vomit or vomit that looks like coffee grounds; bloody or black, tarry stools  · Bleeding that does not stop or bruises that do not heal     Common side effects:  · Minor bleeding or bruising  © [address] 2021 Information is for End User's use only and may not be sold, redistributed or otherwise used for commercial purposes  Cefdinir (Omnicef) - (By mouth)   Why this medicine is used:   Treats bacterial infections  Contact a nurse or doctor right away if you have:  · Blistering, peeling, red skin rash  · Severe or bloody diarrhea     Common side effects:  · Mild diarrhea, nausea  © Copyright [employer] 2021 Information is for End User's use only and may not be sold, redistributed or otherwise used for commercial purposes         Benzonatate (Tessalon Perles, Zonatuss) - (By mouth)   Why this medicine is used:   Treats cough  Contact a nurse or doctor right away if you have:  · Skin rash     Common side effects:  · Drowsiness  · Nausea, upset stomach  · Headache  © [address] [facility] 2021 Information is for End User's use only and may not be sold, redistributed or otherwise used for commercial purposes  Albuterol (Proventil, VoSpire, VoSpire ER) - (By mouth)   Why this medicine is used:   Treats bronchospasm  Contact a nurse or doctor right away if you have:  · Itching or hives, swelling in your face, mouth, throat, or hands, chest tightness, trouble breathing  · Chest pain  · Fast, pounding, or uneven heartbeat     Common side effects:  · Shakiness, restlessness, nervousness, excitement, or trouble sleeping  © [employer] 2021 Information is for End User's use only and may not be sold, redistributed or otherwise used for commercial purposes  Guaifenesin (Allfen, Antitussin, Chest Congestion Relief, Children's Mucinex) - (By mouth)   Why this medicine is used: Thins mucus so you can clear it from your head, throat, and lungs  Contact a nurse or doctor right away if you have:  · Blood in urine  · Pain in side, back, or abdomen     Common side effects:  · Mild nausea, vomiting  © [employer] 2021 Information is for End User's use only and may not be sold, redistributed or otherwise used for commercial purposes  Using Oxygen at [address] [facility]:   You may need extra oxygen if you are not able to breathe enough oxygen on your own  You need a doctor's order to get oxygen therapy  The order will include how much you need, and how often you need it  Use oxygen as directed  DISCHARGE INSTRUCTIONS:   Call, or have someone call, your local emergency number (911 in the [address]) if:   · Your breathing becomes fast, or it hurts to inhale  · You have sudden chest pain      · You are tired, confused, cannot think clearly, or faint     Seek care immediately if:   · You have a headache, your heart is beating fast, and you are shaking  · Your breathing is shallow, slow, or more difficult than usual for you  · You feel anxious or cannot sit still  · Your fingernails or lips turn blue  Call your doctor if:   · The oxygen tubes create sores on your skin, or make you bleed  · You have trouble sleeping because you cannot breathe well  · You have questions or concerns about your condition or care  Types of oxygen supply systems:  Your healthcare provider will pick your oxygen supply based on how much oxygen you need, and how active you are  Oxygen can be supplied the following 3 ways:  · Compressed oxygen  holds oxygen in a metal cylinder (tank) under pressure  The tank can be set to release only the amount of oxygen you need as you breathe  Compressed oxygen tanks are heavy, and are meant to be used when you stay mostly in one place  You may need help to move or secure it  Smaller tanks and wheeled carts are available to help you move with ease, or when you travel  · Liquid oxygen  is kept chilled inside a small, insulated case  The liquid warms and becomes a breathable gas when you need to breathe in  Liquid oxygen cases are smaller and easy to carry around  You can refill your small liquid oxygen case from a big tank kept in your home  Your oxygen delivery service will fill your large tank every 1 to 2 weeks  · An oxygen concentrator  is an electric machine that stores oxygen from the air  This machine is heavy and may come with a wheeled cart to help you move it from room to room  Types of oxygen breathing devices:  Each device is connected to the oxygen supply with tubing  The tubing should be long enough to let you move around your house  You may need a humidifier to moisten the oxygen  This may prevent dryness in your nose, mouth, and throat   Ask your healthcare provider if you need a humidifier, and how to attach it to your oxygen supply  · A nasal cannula  is a 2-pronged plastic tube that fits inside your nostrils  Place one prong in each nostril  Loop the tubing around your ears, or attach it to your eyeglasses to keep it in place  Make sure your cannula fits you well and is comfortable  · An oxygen mask  is attached to a plastic tube and covers your nose and mouth  It is usually held in place by an elastic strap that wraps around the back of your head  You can use an oxygen mask if you need a lot of oxygen  Your healthcare provider may tell you to use a nasal cannula during the day, and a mask at night  A mask may help if your nose is dry or stuffy  · Transtracheal oxygen  is given through a small, flexible catheter inserted into your trachea (windpipe)  A necklace holds the catheter in place  Use oxygen safely:   · Do not use oxygen around heat or flame  Compressed oxygen can catch on fire  Keep the oxygen container 5 feet away from open flames or heaters, such as candles or hot water heaters  Do not use anything flammable, such as cleaning fluids, gasoline, or aerosol sprays near your oxygen  Keep a fire extinguisher and a phone close by in case of a fire  Tell your fire department that you have oxygen in your home if you need to call them for help  · Do not smoke while you are using oxygen  Do not let anyone smoke around you  · Do not change the flow of your oxygen  unless your healthcare provider tells you to  Turn your oxygen container or concentrator off when you are not using your oxygen  · Do not drink alcohol or take sedatives  while you use oxygen  These may slow your breathing  · Put signs on all the doors of your house  to let visitors and emergency workers know that oxygen is in use  Tell your electric company that you have electrical medical equipment  They will put you on a priority list to fix your power quickly if it goes out      · Follow instructions for use and maintenance of your oxygen equipment  Keep oxygen containers secured in an upright position  Oxygen containers may become damaged if they fall over  An oxygen container may cause serious injury if it breaks  Clean your oxygen supplies:   · Wash or replace equipment parts  as directed  Wash your nasal prongs with soap and water twice a week  Replace your nasal prongs every 2 weeks  Replace your tubing every 2 months, or when it becomes stiff  Change the tubing if moisture appears on the inside of the tube  Moisture can make bacteria grow, and cause infections  Change the cannula and tubing after you have a cold or the flu  · Ask your healthcare provider how to clean your oxygen mask or transtracheal catheter  Replace the oxygen mask every 2 weeks  · Disinfect the buttons and outside of your oxygen concentrator  Clean your air filter at least once a week with soap and water  Let it air dry  Replace the filter at least once a week  Ask your oxygen supply company to service your concentrator at least once a year  Ask your healthcare provider if you have any questions about how to clean the air filter  · Wash your humidifier bottle with soap and warm water between each refill  Rinse and air dry the bottle before you refill it with distilled water  Do not use tap water  Disinfect the outside of the bottle and cap once the inside of the bottle has been washed  General tips for oxygen use:   · Keep a backup oxygen supply in case of an emergency  Always keep a backup oxygen tank that does not run on electricity in case there is a power failure  Oxygen may leak out of your container  Ask your healthcare provider if your supply has a tool to reduce wasted oxygen  · Use gauze or water-based lubricants to help soothe your skin  Oxygen may dry out your skin, mouth, or throat  Place gauze on top of your ears or under the tubing on your cheeks if they become sore   Use water-based lubricants on your lips and nostrils if they become dry or sore  Do not use oil-based lubricants  They may be flammable  · Order new oxygen well before your current supply runs out  Your oxygen company may not deliver on holidays  Ask your healthcare provider for help planning your oxygen needs when you travel  · Keep the phone number of your oxygen supply company handy  Place it in an area that you see every day, such as on your fridge  Contact them if you have any problems with your supplies  Follow up with your doctor as directed:  Write down your questions so you remember to ask them during your visits  © Copyright [employer] 2021 Information is for End User's use only and may not be sold, redistributed or otherwise used for commercial purposes  All illustrations and images included in CareNotes® are the copyrighted property of [employer]  or Tex Adkins  The above information is an  only  It is not intended as medical advice for individual conditions or treatments  Talk to your doctor, nurse or pharmacist before following any medical regimen to see if it is safe and effective for you

## 2021-09-10 NOTE — CASE MANAGEMENT
Case Management Discharge Planning Note    Patient name Max Manning  Location [facility] 230/-01 MRN [id number]  : 1995 Date 9/10/2021       Current Admission Date: 2021  Current Admission Diagnosis:  Pulmonary embolism [facility])   Patient Active Problem List   Diagnosis    COVID-19    Pulmonary embolism ([facility] [address] )    Hyponatremia    Tachycardia    Previous Admission - Discharge Date:    LOS (days): 4  Geometric Mean LOS (GMLOS) (days):   Days to GMLOS: Previous Discharge Diagnosis:  No discharge information exists for this patient  Risk of Unplanned Readmission Score  Predictive Model Details          7 (Low)  Factor Value    Calculated 9/10/2021 12:04 28% Active NSAID Rx order present    Risk of Unplanned Readmission Model 16% Number of active Rx orders 16     13% ECG/EKG order present in last 6 months     11% Diagnosis of electrolyte disorder present     10% Imaging order present in last 6 months     8% Number of ED visits in last six months 1     6% Active anticoagulant Rx order present     6% Current length of stay 4 014 days     3% Age 27         BUNDLE:      OBJECTIVE:  Pt is a 27y o  year old Single, black or  [2], male with Quaker preference of None admitted on 2021 12:45 PM  Pt is admitted to [facility] [address] at [address] [place] [address] with complaints of Pulmonary embolism ([facility] [place] [facility] )   Current admission status: Inpatient  Preferred Pharmacy:   PATIENT/FAMILY REPORTS NO PREFERRED PHARMACY  No address on file      [address] [facility] [place] [address]  Phone: [phone] Fax: [phone]    Primary Care Provider: Vinie Lundborg, MD    Primary Insurance: [employer] [employer]  Secondary Insurance:     DISCHARGE DETAILS:    Discharge planning discussed with[de-identified] patient  Freedom of Choice: Yes (insurance information is updated  patient will require nocturnal oxygen    referral is made via parachute for oxygen )    Contacts  Patient Contacts: alex Gomez to Patient[de-identified] Family  Contact Method: Phone  Phone Number: [phone]  Reason/Outcome: Continuity of Care, Emergency Contact, Discharge [address]         Is the patient interested in [facility] at discharge?: No    DME Referral Provided  Referral made for DME?: Yes  DME referral completed for the following items[de-identified] Home Oxygen concentrator  DME Supplier Name[de-identified] [employer]         We would like to be able to fill any required prescriptions on discharge at our [address] [place] [address] and have them delivered to you at discharge in your room    Would you like to participate in this program? : No - Declined    Discharge Destination Plan[de-identified] Home  Transportation at Discharge?: Yes  Type of Transport: Auto with designated  (alex to make arrangement)  Dispatcher Called: No

## 2021-09-10 NOTE — INCIDENTAL FINDINGS
The following findings require follow up:  Radiographic finding   Finding: COVID-19 pneumonia, bilateral; pulmonary emboli, right-sided   Follow up required: CT chest    Follow up should be done within 3 month(s)    Please notify the following clinician to assist with the follow up:   Dr Pierre Kelly

## 2021-09-10 NOTE — UTILIZATION REVIEW
Inpatient Admission Authorization Request   NOTIFICATION OF INPATIENT ADMISSION/INPATIENT AUTHORIZATION REQUEST   SERVICING FACILITY:   [address] [place] [address]  Tax ID: [id number]  NPI: [id number]  Place of Service: [facility]patient [facility]  [address]  Place of Service Code: 24     ATTENDING PROVIDER:  Attending Name and NPI#: Evelyn Carlos Md [[id number]]  Address: [address] [place] [address]  Phone: [phone]     UTILIZATION REVIEW CONTACT:  Karen Shah Utilization   Network Utilization Review Department  Phone: [phone]  Fax [phone]  Email: [email or web address]     PHYSICIAN ADVISORY SERVICES:  FOR [id number] REVIEW - MEDICAL NECESSITY DENIAL  Phone: [phone]  Fax: [phone]  Email: [email or web address]     TYPE OF REQUEST:  Inpatient Status     ADMISSION INFORMATION:  ADMISSION DATE/TIME: 9/6/21 11:44 AM  PATIENT DIAGNOSIS CODE/DESCRIPTION:  Abdominal pain [R10 9]  Pulmonary emboli (HCC) [I26 99]  Pneumonia due to COVID-19 virus [U07 1, J12 82]  DISCHARGE DATE/TIME: 9/10/2021  1:42 PM  DISCHARGE DISPOSITION (IF DISCHARGED): Home/Self Care     IMPORTANT INFORMATION:  Please contact the Karen Shah directly with any questions or concerns regarding this request  Department voicemails are confidential     Send requests for admission clinical reviews, concurrent reviews, approvals, and administrative denials due to lack of clinical to fax [phone]

## 2021-09-10 NOTE — NURSING NOTE
Patient is currently resting in bed with no needs watching TV  Patient reports no pain and vitals are stable  Oxygen saturation is 92-94% on room air

## 2021-09-11 VITALS
RESPIRATION RATE: 18 BRPM | DIASTOLIC BLOOD PRESSURE: 100 MMHG | SYSTOLIC BLOOD PRESSURE: 145 MMHG | BODY MASS INDEX: 30.39 KG/M2 | TEMPERATURE: 99.1 F | HEIGHT: 73 IN | HEART RATE: 100 BPM | WEIGHT: 229.28 LBS | OXYGEN SATURATION: 92 %

## 2021-09-13 LAB — F2 GENE MUT ANL BLD/T: NORMAL

## 2021-09-15 ENCOUNTER — TELEPHONE (OUTPATIENT)
Dept: OTHER | Facility: HOSPITAL | Age: 26
End: 2021-09-15

## 2021-09-15 NOTE — TELEPHONE ENCOUNTER
Called patient to make him aware that he did have abnormality noted on thrombosis panel  Patient was given referral for Hematology and Oncology to follow up as outpatient and did make him aware that he needs to continue with that  Patient also made aware he should follow with his primary care provider  Patient had no complaints and states he had been doing well since hospital discharge

## 2022-05-31 NOTE — ASSESSMENT & PLAN NOTE
· Patient came in with pleuritic chest pain that started last night with nausea, secondary to pain  · Etiology likely secondary to [place]; no family history of blood clots  · D-dimer checked and noted to be elevated  · CTA PE study-multiple right lung segmental pulmonary emboli; no RV strain  Also noted bilateral pneumonia compatible with COVID-19 infection  · Continues with pleuritic chest pain and nausea, likely compounded by pain and medications  · Transitioned to eliquis 10 mg bid x 7 days then 5 mg bid x 3 months  · Continue with supplemental O2 if needed - currently on room air  · Continue with pain control as needed (lidoderm, acetaminophen, tramadol)  · Avoid NSAIDs   · Thrombosis panel pending    · Patient will need follow-up with primary care provider and possibly Hematology referral on discharge vs pulmonology referral for follow up Cimzia Counseling:  I discussed with the patient the risks of Cimzia including but not limited to immunosuppression, allergic reactions and infections.  The patient understands that monitoring is required including a PPD at baseline and must alert us or the primary physician if symptoms of infection or other concerning signs are noted.

## 2022-06-09 NOTE — ED NOTES
Pt's weight taken on stretcher scale, updated in chart   Pharmacy contacted for update heparin drip rate based of weight     Olivier Abarca RN  09/05/21 [id number] Other